# Patient Record
Sex: MALE | Race: WHITE | NOT HISPANIC OR LATINO | Employment: FULL TIME | URBAN - METROPOLITAN AREA
[De-identification: names, ages, dates, MRNs, and addresses within clinical notes are randomized per-mention and may not be internally consistent; named-entity substitution may affect disease eponyms.]

---

## 2018-04-11 ENCOUNTER — TELEPHONE (OUTPATIENT)
Dept: OTHER | Facility: OUTPATIENT CENTER | Age: 28
End: 2018-04-11

## 2018-04-11 NOTE — TELEPHONE ENCOUNTER
Telephone Intake REST  Date: 2018  Client Name:  Justin Morales    MRN: [id number]  : 1990     Age:  28  Caller Name: Self        Presenting Problem / Reason for Assessment   (Clinical History &Symptoms):     Low Libido  Questioning long term medication use (Adderall)   Willing to see therapist       Length of time experiencing symptoms:  Worsening symptoms within the last few month      Therapist:  18 months ago for anxiety        Other Medical/Mental Health Diagnoses:  Sexual Dysfunction (Low Libido)      Medications:  NO        Primary Care Provider: [facility],  [place] [address]                                    [phone]  --If multiple medical conditions, request recent records from primary doctor at the 1st session..      Referral Source:        Follow Up:        Please Verify Registration    If patient has [facility] or [facility], send to .     Prep Welcome Packet and have patient come half hour beforehand to fill out forms in packet (health history form, transgender history, Safety Screening, PHQ9, and EVIE-7.     Paperwork sent   Appt

## 2019-11-19 ENCOUNTER — OFFICE VISIT (OUTPATIENT)
Dept: FAMILY MEDICINE | Facility: CLINIC | Age: 29
End: 2019-11-19
Payer: COMMERCIAL

## 2019-11-19 VITALS
HEART RATE: 82 BPM | HEIGHT: 71 IN | OXYGEN SATURATION: 97 % | BODY MASS INDEX: 31.64 KG/M2 | DIASTOLIC BLOOD PRESSURE: 82 MMHG | WEIGHT: 226 LBS | TEMPERATURE: 98.1 F | SYSTOLIC BLOOD PRESSURE: 135 MMHG

## 2019-11-19 DIAGNOSIS — F41.9 ANXIETY: Primary | ICD-10-CM

## 2019-11-19 DIAGNOSIS — F90.9 ATTENTION DEFICIT HYPERACTIVITY DISORDER (ADHD), UNSPECIFIED ADHD TYPE: ICD-10-CM

## 2019-11-19 DIAGNOSIS — F41.0 PANIC ATTACK: ICD-10-CM

## 2019-11-19 RX ORDER — HYDROXYZINE HYDROCHLORIDE 25 MG/1
25 TABLET, FILM COATED ORAL EVERY 4 HOURS PRN
Qty: 30 TABLET | Refills: 0 | Status: SHIPPED | OUTPATIENT
Start: 2019-11-19 | End: 2023-03-07

## 2019-11-19 RX ORDER — DEXTROAMPHETAMINE SULFATE, DEXTROAMPHETAMINE SACCHARATE, AMPHETAMINE SULFATE AND AMPHETAMINE ASPARTATE 5; 5; 5; 5 MG/1; MG/1; MG/1; MG/1
CAPSULE, EXTENDED RELEASE ORAL
Refills: 0 | COMMUNITY
Start: 2019-10-25 | End: 2020-02-10

## 2019-11-19 RX ORDER — ALUMINUM CHLORIDE 20 %
SOLUTION, NON-ORAL TOPICAL
Refills: 11 | COMMUNITY
Start: 2019-01-07 | End: 2020-01-23

## 2019-11-19 ASSESSMENT — MIFFLIN-ST. JEOR: SCORE: 2020.13

## 2019-11-19 NOTE — PROGRESS NOTES
Preceptor Attestation:   Patient seen, evaluated and discussed with the resident. I have verified the content of the note, which accurately reflects my assessment of the patient and the plan of care.   Supervising Physician:  Bulmaro Packer MD.

## 2019-11-19 NOTE — PROGRESS NOTES
"I was present with the medical student who participated in the service and in the documentation of this note. I have verified the history and personally performed the physical exam and medical decision making, and have verified the content of the note, which accurately reflects my assessment of the patient and the plan of care.   Dana Reyes MD           New Patient Note          HPI         Concerns today: Anxiety and ADHD    Patient presents with:  Establish Care  Referral: ADHD Testing     Anxiety  -had panic attacks in high school,  -started sometime around the age of 20, saw a psychologist (psychiatrist?) after having panic attacks including a reallly bad attack before shoulder surgery   -was diagnosed with anxiety, strong family history of ADHD, was also diagnosed  -over the last few months to a year, panic attacks have been starting to come back  -during an attack, will experience: tachycardia to 100-150's (will generally remove fitbit after reaching 150, as seeing the high number makes him more anxious, sweating, severe anxiety, feels like something horrible is happening)  -usually is able to \"fight through\" the panic attacks and carry on with tasks and ADLs, but is interested in behavioral therapy and learning coping techniques to deal with panic attacks and day-to-day anxiety more effectively    Of note, 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 He feels like his panic attacks and anxiety have worsened as the stressors associated with his program have worsened. (does have positive stresses in life, though- just got  this year and bought a house, happy with these changes)    Additionally, he hsa a flight coming up on 11/24 that he's terrified about, as he had a panic attack on his last flight. He is worried about experiencing another of those panic attacks on the flight.      ADHD  -diagnosed with ADHD around the same time he was " "diagnosed with anxiety by the psychologist he saw when he was 20  -was placed on 20 mg Adderall daily, with 10 mg bumper he could use as needed (although the bumper didn't do much for him, so he usually didn't take it)  Initial benefits: felt like he was able to get his life in order, felt more organized, felt like he was managing things, he felt his actions and his mind were less chaotic, had less trouble with mental hyperactivity, was sleeping better, not as anxious about things  -took Adderall continuously for about 7-8 years (some breaks due to his being in school out of state and away from his prescribing provider)  -as time went on, felt more unease, body aches (especially legs), sexual side effects (\"libido wihplash\"- rapid increases and decreases, affected sexual performance over time)   -talked to about 3 different doctors about sexual side effects of Adderall  -as time went on, felt more unease, body aches (especially legs), sexual side effects (\"libido wihplash\"- rapid increases and decreases, affected sexual performance over time)   -talked to about 3 different doctors about sexual side effects of Adderall  -eventually stopped taking Adderall and said he felt fine; felt he could manage daily life and tasks well  -however, with return of panic attacks, he started noticing difficulties with focusing on tasks, difficulties managing his anxiety, difficulties organizing things and keeping his mind from being hyperactive about things, so he wanted to start Adderall again  -just started Adderall again around 10/25 (prescribed by doctor he saw regarding Lyme disease), about one week of continuous dosing  -feels he is getting all of the bad effects from Adderall: hyper focused in a bad way, like distant focus on things that make him anxious rather than on more immediate tasks, will wake up with bad nightmares, difficult to get back to sleep, unable to focus and complete tasks like normal    Would like to be able " to get through 7-8 months with med function as normal, or get new behavioral techniques to help cope with stress and anxiety.    Planning to obtain primary cares here.    There is no problem list on file for this patient.      Past Medical History:   Diagnosis Date     ADHD      Anxiety        Previous Medical Care   [facility] Kedar CORTEZ signed     History reviewed. No pertinent family history.           Review of Systems:     Review of Systems:  CONSTITUTIONAL: NEGATIVE for fever, chills, change in weight  INTEGUMENTARY/SKIN: NEGATIVE for worrisome rashes, moles or lesions  EYES: NEGATIVE for vision changes or irritation  ENT/MOUTH: NEGATIVE for ear, mouth and throat problems  RESP: NEGATIVE for significant cough or SOB  BREAST: NEGATIVE for masses, tenderness or discharge  CV: NEGATIVE for chest pain, palpitations or peripheral edema  GI: NEGATIVE for nausea, abdominal pain, heartburn, or change in bowel habits  : NEGATIVE for frequency, dysuria, or hematuria  MUSCULOSKELETAL: NEGATIVE for significant arthralgias or myalgia  NEURO: NEGATIVE for weakness, dizziness or paresthesias  ENDOCRINE: NEGATIVE for temperature intolerance, skin/hair changes  HEME/ALLERGY: NEGATIVE for bleeding problems  PSYCHIATRIC: NEGATIVE for changes in mood or affect; anxiety  Sleep:   Do you snore most or the night (as reported by a family member)? No  Do you feel sleepy or extremely tired during most of the day? No             Social History     Social History     Socioeconomic History     Marital status: Single     Spouse name: Not on file     Number of children: Not on file     Years of education: Not on file     Highest education level: Not on file   Occupational History     Not on file   Social Needs     Financial resource strain: Not on file     Food insecurity:     Worry: Not on file     Inability: Not on file     Transportation needs:     Medical: Not on file     Non-medical: Not on file   Tobacco Use     Smoking status: Former  "Smoker     Smokeless tobacco: Never Used   Substance and Sexual Activity     Alcohol use: Yes     Comment: socially      Drug use: Never     Sexual activity: Yes     Partners: Female   Lifestyle     Physical activity:     Days per week: Not on file     Minutes per session: Not on file     Stress: Not on file   Relationships     Social connections:     Talks on phone: Not on file     Gets together: Not on file     Attends Jew service: Not on file     Active member of club or organization: Not on file     Attends meetings of clubs or organizations: Not on file     Relationship status: Not on file     Intimate partner violence:     Fear of current or ex partner: Not on file     Emotionally abused: Not on file     Physically abused: Not on file     Forced sexual activity: Not on file   Other Topics Concern     Not on file   Social History Narrative     Not on file       Marital Status:  Who lives in your household? Wife and dog.    Has anyone hurt you physically, for example by pushing, hitting, slapping or kicking you or forcing you to have sex? Denies  Do you feel threatened or controlled by a partner, ex-partner or anyone in your life? Denies    Sexual Health     Sexual concerns: has had concerns regarding sexual side effects of Adderall in the past; not as concerned about them right now (other bad effects of Adderall of greatest concern)   STI History: Neg             Physical Exam:     Vitals: /82 (BP Location: Left arm, Patient Position: Sitting, Cuff Size: Adult Regular)   Pulse 82   Temp 98.1  F (36.7  C) (Oral)   Ht 1.8 m (5' 10.87\")   Wt 102.5 kg (226 lb)   SpO2 97%   BMI 31.64 kg/m    BMI= Body mass index is 31.64 kg/m .  Vitals were reviewed and were normal  GENERAL: healthy, alert and no distress  EYES: Eyes grossly normal to inspection, extraocular movements - intact, and PERRL  HENT: Normocephalic and atraumatic.  NECK: ROM normal.  RESP: No increased work of breathing. No audible " wheezing.  CV: No JVD. Nail beds pink and well-perfused.  ABDOMEN: Soft, non-distended.  MS: extremities- no gross deformities noted, no edema  SKIN: no suspicious lesions, no rashes  NEURO: mentation- intact, speech- normal  PSYCH: Alert and oriented times 3; speech- coherent , normal rate and volume; able to articulate logical thoughts, able to abstract reason, no tangential thoughts, no hallucinations or delusions, affect- normal      Assessment and Plan      Justin was seen today for establish care and referral.    Diagnoses and all orders for this visit:    Anxiety  -     BEHAVIORAL HEALTH REFERRAL ([facility] interal and external)  -     hydrOXYzine (ATARAX) 25 MG tablet; Take 1 tablet (25 mg) by mouth every 4 hours as needed for itching    Symptoms of anxiety and ADHD difficult to separate completely, although considering situational stressors, it appears like anxiety may be mimicking ADHD symptoms. Would explain why adderall is not helping and is instead making him focus on things that make him anxious. He has stopped adderall, so recommended waiting to restart adderall (or at least decreasing dose of adderall) until anxiety can be addressed. He was interested in behavioral therapy so a referral was made. Also provided him with a prescription for hydroxyzine to aid in management of anxiety. If hydroxyzine does not provide sufficient relief of symptoms, recommended he call the clinic, and a prescription for ativan may be provided, especially for his upcoming flight.    He is planning to establish primary care here, so recommended regular follow up as needed to reassess medication efficacy and anxiety and symptom management.      Options for treatment and follow-up care were reviewed with the patient. Justin Morales engaged in the decision making process and verbalized understanding of the options discussed and agreed with the final plan.    Soha Tom, MS3

## 2019-11-19 NOTE — PATIENT INSTRUCTIONS
Here is the plan from today's visit    Anxiety  - BEHAVIORAL HEALTH REFERRAL ([facility] interal and external)  1) Our behavioural health team will call you to make an appointment - if you do not hear from us in 1 week please call us.  2) Use hydroxyzine as needed for anxiety. If you are concerned about your flight and hydroxyzine does not work well, please call and we can prescribe other medications for your flight.  - hydrOXYzine (ATARAX) 25 MG tablet; Take 1 tablet (25 mg) by mouth every 4 hours as needed for itching  Dispense: 30 tablet; Refill: 0    Please call or return to clinic if your symptoms don't go away.  Thank you for coming to [facility] today.  Lab Testing:  **If you had lab testing today and your results are reassuring or normal they will be mailed to you or sent through [employer] within 7 days.   **If the lab tests need quick action we will call you with the results.  The phone number we will call with results is # [phone] (home) . If this is not the best number please call our clinic and change the number.  Medication Refills:  If you need any refills please call your pharmacy and they will contact us.   If you need to  your refill at a new pharmacy, please contact the new pharmacy directly. The new pharmacy will help you get your medications transferred faster.   Scheduling:  If you have any concerns about today's visit or wish to schedule another appointment please call our office during normal business hours [phone] (8-5:00 M-F)  If a referral was made to a [facility] and you don't get a call from central scheduling please call [phone].  If a Mammogram was ordered for you at [facility] call [phone] to schedule or change your appointment.  If you had an XRay/CT/Ultrasound/MRI ordered the number is [phone] to schedule or change your radiology appointment.   Medical Concerns:  If you have urgent medical concerns please call  [phone] at any time of the day.    Dana Reyes MD

## 2019-11-20 ENCOUNTER — TELEPHONE (OUTPATIENT)
Dept: PSYCHOLOGY | Facility: CLINIC | Age: 29
End: 2019-11-20

## 2019-11-20 NOTE — LETTER
December 4, 2019    Justin Morales  [address]  [place] [address]      Dear: Justin Morales    You were recently referred for a Behavioral Health appointment by your primary care provider at [facility].  We have called twice to schedule this appointment, but have been unable to reach you.  If you are interested in receiving this service, please call [facility] at [phone].  We would be happy to schedule this appointment for you.      Thank you.      Sincerely,    Patient Representative    [facility]  Hours:  Monday-Friday 8:00 am - 5:00 pm   Phone Number: [phone]

## 2019-11-20 NOTE — TELEPHONE ENCOUNTER
Psychiatry Consult and  Referral:  Psychiatry Consult Referral:  Please schedule this patient with Dr. Paiz.  This is for a one time consult only, not for ongoing psychiatric care.  She will provide recommendations to the PCP for ongoing care.     Please let the patient know that this is an educational consult clinic.  For that reason, Dr. Paiz and a resident will be seeing the patient together.  If the patient is not comfortable with that we can assist with making arrangements for a psychiatry consult at an outside clinic.    If you are unable to reach the patient after two phone calls, please send a letter and close this encounter.    Thank you!    -------------------------------------------------------------------    AND    -------------------------------------------------------------------    Mental Health Referral:  Please schedule with Dr. Moon or Jamie      Please let patient know this is for primary care behavioral health services.  Therapists at our clinic are able to see patients for 8-12 sessions. If further assistance is needed, they will help the patient connect with ongoing services in the community.    If you are unable to reach the patient after two phone attempts, please send a letter and close the encounter.      Thank you!

## 2019-11-20 NOTE — TELEPHONE ENCOUNTER
Left voicemail to call direct line for assistance with scheduling.    Shanti Chao CMA  [facility]

## 2019-11-22 NOTE — TELEPHONE ENCOUNTER
Mental Health appointment scheduled.     Psychiatrist has not been scheduled due to Dr Paiz schedule is full until 1/9/20.    I will follow up when schedule is open.      Shanti Chao CMA  [facility]

## 2019-12-02 NOTE — TELEPHONE ENCOUNTER
The schedule is is still not available for scheduling.    Shanti Chao CMA  [facility]

## 2019-12-04 NOTE — TELEPHONE ENCOUNTER
Left voicemail to call direct line for assistance with scheduling Dr Paiz (only).    Letter mailed     Shanti Chao CMA  [facility]

## 2019-12-11 ENCOUNTER — OFFICE VISIT (OUTPATIENT)
Dept: PSYCHOLOGY | Facility: CLINIC | Age: 29
End: 2019-12-11
Payer: COMMERCIAL

## 2019-12-11 DIAGNOSIS — F41.0 GENERALIZED ANXIETY DISORDER WITH PANIC ATTACKS: Primary | ICD-10-CM

## 2019-12-11 DIAGNOSIS — F41.1 GENERALIZED ANXIETY DISORDER WITH PANIC ATTACKS: Primary | ICD-10-CM

## 2019-12-11 ASSESSMENT — ANXIETY QUESTIONNAIRES
2. NOT BEING ABLE TO STOP OR CONTROL WORRYING: SEVERAL DAYS
5. BEING SO RESTLESS THAT IT IS HARD TO SIT STILL: SEVERAL DAYS
3. WORRYING TOO MUCH ABOUT DIFFERENT THINGS: SEVERAL DAYS
7. FEELING AFRAID AS IF SOMETHING AWFUL MIGHT HAPPEN: SEVERAL DAYS
6. BECOMING EASILY ANNOYED OR IRRITABLE: SEVERAL DAYS
IF YOU CHECKED OFF ANY PROBLEMS ON THIS QUESTIONNAIRE, HOW DIFFICULT HAVE THESE PROBLEMS MADE IT FOR YOU TO DO YOUR WORK, TAKE CARE OF THINGS AT HOME, OR GET ALONG WITH OTHER PEOPLE: SOMEWHAT DIFFICULT
4. TROUBLE RELAXING: MORE THAN HALF THE DAYS
GAD7 TOTAL SCORE: 9
1. FEELING NERVOUS, ANXIOUS, OR ON EDGE: MORE THAN HALF THE DAYS

## 2019-12-11 ASSESSMENT — PATIENT HEALTH QUESTIONNAIRE - PHQ9: SUM OF ALL RESPONSES TO PHQ QUESTIONS 1-9: 2

## 2019-12-11 NOTE — PROGRESS NOTES
Behavioral Health Diagnostic Assessment:    Meeting was: scheduled  Others present: {OTHERS PRESENT:302167}  Meeting lasted: *** minutes  Client was: {ON TIME:603246}    {If  used, use [place])    Assessment Summary: Diagnostic completed today. The patient is a 29 year old  male who was referred for mental health services for help with ***. Based on the patient's report of symptoms, {HE OR SHE:076012} likely meets criteria for ***. However, additional assessment is warranted and a diagnosis of *** is being given today. The patient's mental health concerns have been affecting{HE OR SHE:604377} ability to function *** and have been causing clinically significant distress. The patient also reports *** drug/alcohol use amount frequency and duration. The patient is also struggling with *** insert other problematic psychosocial stressors. Justin reports/denies [safety concerns]. ***Insert any additional pertinent information here including behavioral observations/interaction style, etc. Based on the patient's reported symptoms and impact on functioning, the plan for the patient is ***.    DSM-V Diagnoses:  ***    Orientation, Recommendations, & Plan:       Rights to and limits to confidentiality were discussed with patient.    Integrated care team and shared chart were discussed with patient and agreed upon.    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    Patient was given informational handout on postdoctoral fellow status and was invited to ask questions. [Given the language barrier, the handout was reviewed with the patient and he was asked whether he had a resource to help him translate the contents of the handout. The patient {insert response to language information}]     Follow-up in *** to establish regular psychotherapy to address ***.    Goals for therapy = ***    {Collaboration:251588}.    {Referral to  "Professional:475997}.    {DANA:277106}.    Mental Health Screening Questionnaires:    No flowsheet data found.  No flowsheet data found.  PTSD-PC = ***/4  CAGE AID:  ***/4       Current Presenting Problems or Complaints (including patient perception of problem and external factors contributing to current dilemma): ***    Review of Symptoms:  Depression: ***  Gabby: ***  Psychosis: ***  Anxiety: ***  Post Traumatic Stress Disorder: ***    Functioning:  ***    Patient Strengths and Resources: ***    Previous Mental Health Concerns/Treatment:    Outpatient: {NONE:171902::\"None\"}    Inpatient: {NONE:441876::\"None\"}    Chemical Health History:    Alcohol Use: ***  Drug Use: ***  Prescription Misuse: ***  Tobacco Use: ***    Have you ever thought about cutting down your drug/alcohol use?  {YES/NO:890494}  Do you ever get annoyed when people ask you about your drug/alcohol use: {YES/NO:096266}  Do you ever feel guilty about your drug/alcohol use: {YES/NO:790997}  Do you ever drink alcohol or use drugs in the morning: {YES/NO:664359}    Patient past attempts to control alcohol/drug use (including informal approaches or formal treatment): ***  Have there been any consequences related to clients drug use? {DRUG USE CONSEQUENCES:414508}.    Mental Status Exam:    Appearance:  {APPEARANCE:441948}  Behavior/Relationship to Examiner/Demeanor:  {:782272}  Build: {BUILD:906177}  Gait:  {NORMAL, ABNORMAL:159887::\"Normal\"}  Psychomotor Activity: {AGITATION / RETARDATION:234862}  Speech rate:  {:636643}  Speech volume:  {:961605}  Speech coherence:  {:427828}  Speech spontaneity:  {:065864}  Mood (subjective report):  {MOOD (SUBJECTIVE REPORT):567643}  Affect (objective appearance):  {:668619}  Eye Contact: {eye contact:473929}  Thought Process (Associations):  {THOUGHT PROCESS (ASSOCIATIONS):126703}  Thought process (Rate):  {:037360}  Abnormal Perception:  {:117843}  Sensorium:  {:189695}  Attention/Concentration:  " {:616135}  Insight:  {INSIGHT:042240}  Judgment:  {JUDGMENT:214033}    Suicide Assessment:    Recent suicidal thoughts: {YES (EXPLAIN)/NO:130690}  Past suicidal thoughts: {YES (EXPLAIN)/NO:829206}  Any attempts in the past: {YES (EXPLAIN)/NO:228772}  Any family/friends/loved ones die by suicide: {YES (EXPLAIN)/NO:578180}  Plan or considering various methods: {YES (EXPLAIN)/NO:269706}  Access to guns: {YES (EXPLAIN)/NO:669707}  Protective factors: {Suicide Protective Factors:730740}  Verbal contract for safety: {YES/NO:998532}    Non-Suicidal Self Injurious Behavior: {YES (EXPLAIN)/NO:115573}    Violence/Homicide Risk Assessment:     Problems with anger management: {YES (EXPLAIN)/NO:045397}  History of violence: {YES (EXPLAIN)/NO:081898}  History of significant damage to property: {YES (EXPLAIN)/NO:066971}  Threat made to harm or kill someone: {YES (EXPLAIN)/NO:485924}  Verbal contract for safety: {YES/NO:080872}    Safety Plan:   Justin was provided with the phone number for emergency mental health services and was encouraged to call these local crisis numbers, 911, or visit a local emergency room if thoughts of suicide or homicide were to arise and/or if they were to be in acute distress. The patient agreed to utilize these services as indicated if the need were to arise. Justin denied past or current suicidal or homicidal ideation, intent, or plan, denied a history of suicide attempts/self-harming behaviors, and identified *** as  primary protective factor(s) to self-harm or harm to others. Based on these factors, Justin is considered to be sustainable as an outpatient at this time.     Social History and Associated Level of Functioning (See below):    Current Living Arrangements: Lives in a home with his partner in their recently bought house.     Family/Children: ***    Intimate Relationship/Marriage: Recently     Social Connection: ***    Developmental History: ***    Abuse/Trauma: ***    Work:  "***    Education: He is a doctoral  in physics at the [employer]    Legal: ***    Cultural/Belief System: ***    Personal Health:     Patient Active Problem List   Diagnosis     ADHD (attention deficit hyperactivity disorder)     Anxiety     Current Outpatient Medications   Medication     ADDERALL XR 20 MG 24 hr capsule     DRYSOL 20 % external solution     hydrOXYzine (ATARAX) 25 MG tablet     No current facility-administered medications for this visit.        Family Health History: ***    NOTE: Diagnostic complete   NOTE: Diagnostic incomplete. Will be completed at next visit.    Primary Care PTSD Screen  In your life, have you ever had any experience that was so frightening, horrible or upsetting that, in the past month, you...    1. Have had nightmares about it or thought about it when you did not want to? {YES/NO:870955}  2. Tried hard not to think about it or went out of your way to avoid situations that remind you of it? {YES/NO:393370}  3. Were constantly on guard, watchful, or easily startled? {YES/NO:601462}  4. Felt numb or detached from others, activities, or your surroundings? {YES/NO:634330}    Current research suggests that the results of the PC-PTSD should be considered \"positive\" if a patient answers \"yes\" to any (3) items.    References    MICHELA Fu, REANNA Hwaley, Kimerling, R., ROSA MARIA Ashby., LOUISE Gonzalez., TETO Zaldivar., MICHELA Woo, GERBER Phoenix, Pantoja, J.I. (2004). The primary care PTSD screen (PC-PTSD): development and operating characteristics. Primary Care Psychiatry, 9, 9-14.    "

## 2019-12-11 NOTE — PROGRESS NOTES
Behavioral Health Diagnostic Assessment:    Meeting was: scheduled  Others present: none  Meeting lasted: 40 minutes  Client was: on time    Assessment Summary: Diagnostic completed today. The patient is a 29 year old  male who was referred for mental health services for help with panic attacks and symptoms of anxiety. Based on the patient's report of symptoms, he likely meets criteria for generalized anxiety disorder with panic attacks. The patient's mental health concerns have been affecting his ability to function at work and in his personal life, and have been causing clinically significant distress. The patient reports no concerns with drug/alcohol use. The patient is also struggling with psychosocial stressors including completing his doctoral degree and job searching. Justin denies suicidal ideation, plans, means, or intent. Based on the patient's reported symptoms and impact on functioning, the plan for the patient is to initiate psychotherapy services at [facility] to address anxiety and panic symptoms.    DSM-V Diagnoses:  Generalized anxiety disorder with panic attacks    Orientation, Recommendations, & Plan:       Rights to and limits to confidentiality were discussed with patient.    Integrated care team and shared chart were discussed with patient and agreed upon.    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    Patient was given informational handout on postdoctoral fellow status and was invited to ask questions.     Follow-up in 2 to establish regular psychotherapy to address problematic symptoms of anxiety and panic symptoms.    Goals for therapy = Reduce anxiety and learn coping strategies to manage anxiety and panic attacks    Mental Health Screening Questionnaires:    PHQ-9 SCORE 12/11/2019   PHQ-9 Total Score 2     EVIE-7 SCORE 12/11/2019   Total Score 9     PTSD-PC = 0/4  CAGE AID:  1/4       Current Presenting Problems or Complaints (including patient  "perception of problem and external factors contributing to current dilemma): Justin reported a history of panic attacks and anxiety. He reported having panic attacks since a child and teenager. Most of his anxiety then centered on unmanaged ADHD symptoms which ameliorated once he started ADHD medication. He reported having attacks infrequently after this. Reported feeling anxious and stressed in college, but felt he could manage this. For the past year, reported increasing anxiety due to various stressors and life changes. He noted that panic attacks have worsened in the past 6 months. Described symptoms consistent with panic attacks (sweating, increased heart rate and breathing, racing thoughts, dizziness, fear of losing control). Panic attacks occur about once a week or every two weeks.     He reported various reasons that contribute to increased anxiety. He is on his last year of his doctoral program and is hoping to defend in May. Discussed the difficulties of getting his projects/research to work. Noted that his advisor will be leaving and that they've had difficulties with funding. He's also job searching and interviewing at the moment. 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 Said these are highly paid jobs, however, they are very time-demanding (\"you're always on call because you're the only one who can fix those machines\"). He no longer wishes to work in this environment. Is considering teaching jobs in high school or switching into some form of business. This has created additional stress because he is overqualified or lacks some of the experience employers are looking for. Justin also recently got  and they bought a house together. Denied financial stress since his wife is in a stable and well-paying job, however, he's feeling pressure in having more commitments to think of as he's job-searching.     Review of Symptoms:  Depression: Denied symptoms " of depression. Endorsed some difficulties with sleep or feeling tired which he attributed to long work schedule and stress.   Gabby: Denied  Psychosis: Denied  Anxiety: Reported feeling anxious more days than not, has several worries, difficulty controlling worry, unable to relax, feels restless, irritable, feels afraid something awful will happen, and difficulty sleeping  Post Traumatic Stress Disorder: Denied    Functioning:  Noted that he usually feels able to manage anxiety overall, however, this is much higher than usual and his coping strategies do not seem to be reducing anxiety as it used to in the past. As a result, he is having increased panic attacks which are debilitating at work and at home.     Patient Strengths and Resources: Very intelligent, resourceful, and methodical problem-solving.    Previous Mental Health Concerns/Treatment:    Outpatient: Saw a therapist in high school for panic attacks related to a shoulder injury and with ADHD symptoms. Saw him intermittently for about a year. Saw a therapist for one session in college.     Inpatient: None    Chemical Health History:    Alcohol Use: Reported social use, no concerns identified  Drug Use: Denied  Prescription Misuse: Denied  Tobacco Use: Denied    Have you ever thought about cutting down your drug/alcohol use?  Yes, in the context of abnormal liver enzymes, however, another reason was identified  Do you ever get annoyed when people ask you about your drug/alcohol use: No  Do you ever feel guilty about your drug/alcohol use: No  Do you ever drink alcohol or use drugs in the morning: No    Patient past attempts to control alcohol/drug use (including informal approaches or formal treatment): N/A  Have there been any consequences related to clients drug use? no.    Mental Status Exam:    Appearance:  Casually dressed and Well groomed  Behavior/Relationship to Examiner/Demeanor:  Cooperative, Engaged and Pleasant  Build: medium  Gait:   "Normal  Psychomotor Activity: agitation  Speech rate:  Rapid  Speech volume:  Normal  Speech coherence:  Normal  Speech spontaneity:  Normal  Mood (subjective report):  \"good\"  Affect (objective appearance):  Appropriate/mood-congruent and Euthymic  Eye Contact: good  Thought Process (Associations):  Logical, Linear and Goal directed  Thought process (Rate):  Normal  Abnormal Perception:  None  Sensorium:  Alert  Attention/Concentration:  Normal  Insight:  Fair  Judgment:  Fair    Suicide Assessment:    Recent suicidal thoughts: No  Past suicidal thoughts: No  Any attempts in the past: No  Any family/friends/loved ones die by suicide: No  Plan or considering various methods: No  Access to guns: No  Protective factors: no h/o suicide attempt, no plan or intent, no h/o risky impulsive behavior, h/o seeking help when needed, symptom improvement, future oriented, feeling hopeful, commitment to family, good social support   and stable housing  Verbal contract for safety: N/A    Non-Suicidal Self Injurious Behavior: No    Violence/Homicide Risk Assessment:     Problems with anger management: No  History of violence: No  History of significant damage to property: No  Threat made to harm or kill someone: No  Verbal contract for safety: N/A    Safety Plan:   Justin was provided with the phone number for emergency mental health services and was encouraged to call these local crisis numbers, 911, or visit a local emergency room if thoughts of suicide or homicide were to arise and/or if they were to be in acute distress. The patient agreed to utilize these services as indicated if the need were to arise. Justin denied past or current suicidal or homicidal ideation, intent, or plan, denied a history of suicide attempts/self-harming behaviors, and identified his family and friends as  primary protective factor(s) to self-harm or harm to others. Based on these factors, Justin is considered to be sustainable as an outpatient " "at this time.     Social History and Associated Level of Functioning (See below):    Current Living Arrangements: Lives in a home with his partner in their recently bought house.     Family/Children: No children. Born in [place] with parents and two siblings (25 and 23 years old).     Intimate Relationship/Marriage: Recently  in June. Had been together for 3 years beforehand. Described as a very positive and happy relationship.     Social Connection: Lots of close friends.     Developmental History: WNL. Diagnosed with ADHD in high school.    Abuse/Trauma: Denied.     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    Legal: None    Cultural/Belief System: No Anabaptism beliefs.     Personal Health:     Patient Active Problem List   Diagnosis     ADHD (attention deficit hyperactivity disorder)     Anxiety     Current Outpatient Medications   Medication     ADDERALL XR 20 MG 24 hr capsule     DRYSOL 20 % external solution     hydrOXYzine (ATARAX) 25 MG tablet     No current facility-administered medications for this visit.        Family Health History: History of ADHD and panic attacks in family    NOTE: Diagnostic complete     Primary Care PTSD Screen  In your life, have you ever had any experience that was so frightening, horrible or upsetting that, in the past month, you...    1. Have had nightmares about it or thought about it when you did not want to? No  2. Tried hard not to think about it or went out of your way to avoid situations that remind you of it? No  3. Were constantly on guard, watchful, or easily startled? No  4. Felt numb or detached from others, activities, or your surroundings? No    Current research suggests that the results of the PC-PTSD should be considered \"positive\" if a patient answers \"yes\" to any (3) items.    References    MICHELA Fu, Marleen P., " Kimerling, R., ROSA MARIA Ashby., LOUISE Gonzalez., TETO Zaldivar., MICHELA Woo, JOSE Phoenix., , JFrankoI. (2004). The primary care PTSD screen (PC-PTSD): development and operating characteristics. Primary Care Psychiatry, 9, 9-14.

## 2019-12-11 NOTE — PATIENT INSTRUCTIONS
Thank you for coming in today.    Just a reminder that if you experience an increase in symptoms or feel you are in crisis, you can call the clinic number ([phone]).      If you are having an acute psychiatric emergency, please call 911 or have someone drive you directly to [facility] for further evaluation.  The homework/goals we discussed today are: Read through handout  Please schedule at the  for January 3, 2020 at 8:20 am.  Again thank you for choosing [facility] and please let us know how we can best partner with you to improve your and your family's health.    Panic Disorder Handout  What Is a Panic Attack?   Sometimes we experience a sudden and severe onset of symptoms that can be scary. These symptoms can include some or all of the following:    Pounding heart or increased heart rate   Feeling dizzy, unsteady, lightheaded, or faint     Sweating    Nausea   Feelings of unreality or being detached from yourself     Trembling or shaking   Fear of losing control or going crazy     Shortness of breath   Fear of dying     Feeling of choking   Numbness or tingling     Chest pain   Chills or hot flashes     Although we don t fully understand why some people experience panic attacks and other people don t, we do know that these symptoms are related to a very normal response called the fight-flight response. This response allows our body to react quickly when we think that something is dangerous, such as being attacked or being cut off when we are driving.     How Panic Attacks Affect Our Lives   Because symptoms of a panic attack occur out of the blue, we can become worried about them, and we may begin to avoid situations that we think will result in these panic symptoms, such as crowded stores, public transportation, or driving. What situations have you avoided because of panic attacks?            Changing Thinking Patterns  One of the most important changes  associated with decreasing panic attacks is changing how we think. The fear associated with having a panic attack may increase the likelihood of having an attack. Therefore, a willingness to experience a panic attack, knowing that the symptoms, although uncomfortable, will not harm you, is an important aspect of managing the symptoms of panic.    Thinking that increases panic Thinking that decreases panic   I m having a heart attack! This is not an emergency.   I m going to die. This doesn t feel good, but it won t hurt me.   I can t stand this. I can feel uncomfortable and still be OK.   I have to get out of here. This will go away with time.   Oh no, here it comes! I can handle this.     What are the things that you say to yourself that may increase panic symptoms?               What could you say to decrease panic symptoms?         Breathing Retraining  People who have panic attacks show some signs of hyperventilation or overbreathing. When people hyperventilate, certain blood vessels in the body become narrower, which can contribute to numbness or tingling in the hands or feet or the sensation of cold, clammy hands, and increased heart rate. You can help overcome overbreathing by learning breathing control.  Instructions for Breathing Retraining   1. Choose a comfortable quiet location.  2. Count,  One,  on the first breath in, and think,  Relax,  on the breath out.  3. Focus your attention on breathing and counting.  4. Maintain a normal rate and depth of breathing.  5. Expand your abdomen on the breath in and keep your chest still.  6. Continue counting up to 10 and back to 1.  7. Practice two times per day, for 10 minutes each time.  Decreasing Avoidance  Regardless of whether you can identify why you began having panic attacks or whether they seemed to come out of the blue, the places where you began having panic attacks often can become triggers themselves.   To break the cycle of avoidance, it is important  to first identify the places or situations that are being avoided and then to do some  relearning.  Just as the negative experience of a panic attack can result in learning to avoid certain locations, having positive, successful experiences can result in learning that the location is nothing to be afraid of.    Which item on your list of avoided locations or situations would you like to target first?     Please list this situation here: _______________________   Now, you can develop a hierarchy for this situation or location. A hierarchy is a list of situations that result in increasingly higher intensities of anxiety. Develop a list of situations that result in a range of anxiety intensities, that is, some result in low intensity and others result in higher intensities. Then rank order the situation from the lowest to the highest anxiety producing situations. This hierarchy will help guide you as you gradually begin to  expose  yourself to the situation or location that you have been avoiding    JAMMIE Saenz., FREDERIC Nielson., Emil, M. S., & MICHELA Chau. (2009). Integrated Behavioral [place] in Primary Care: Step-by-Step Guidance for Assessment and Intervention. American Psychological Association.

## 2019-12-11 NOTE — PROGRESS NOTES
Behavioral Health Diagnostic Assessment:    Meeting was: scheduled  Others present: {OTHERS PRESENT:320452}  Meeting lasted: *** minutes  Client was: {ON TIME:984276}    {If  used, use Xencor)    Assessment Summary: Diagnostic completed today. The patient is a 29 year old  male who was referred for mental health services for help with ***. Based on the patient's report of symptoms, {HE OR SHE:701836} likely meets criteria for ***. However, additional assessment is warranted and a diagnosis of *** is being given today. The patient's mental health concerns have been affecting{HE OR SHE:852991} ability to function *** and have been causing clinically significant distress. The patient also reports *** drug/alcohol use amount frequency and duration. The patient is also struggling with *** insert other problematic psychosocial stressors. Justin reports/denies [safety concerns]. ***Insert any additional pertinent information here including behavioral observations/interaction style, etc. Based on the patient's reported symptoms and impact on functioning, the plan for the patient is ***.    DSM-V Diagnoses:  ***    Orientation, Recommendations, & Plan:       Rights to and limits to confidentiality were discussed with patient.    Integrated care team and shared chart were discussed with patient and agreed upon.    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    Patient was given informational handout on postdoctoral fellow status and was invited to ask questions. [Given the language barrier, the handout was reviewed with the patient and he was asked whether he had a resource to help him translate the contents of the handout. The patient {insert response to language information}]     Follow-up in *** to establish regular psychotherapy to address ***.    Goals for therapy = ***    {Collaboration:168812}.    {Referral to  "Professional:834916}.    {DANA:550056}.    Mental Health Screening Questionnaires:    No flowsheet data found.  No flowsheet data found.  PTSD-PC = ***/4  CAGE AID:  ***/4       Current Presenting Problems or Complaints (including patient perception of problem and external factors contributing to current dilemma): ***    Started having panic attacks in high school. Met with a therapist for a year.     History of ADHD in family.     Review of Symptoms:  Depression: ***  Gabby: ***  Psychosis: ***  Anxiety: ***  Post Traumatic Stress Disorder: ***    Functioning:  ***    Patient Strengths and Resources: ***    Previous Mental Health Concerns/Treatment:    Outpatient: {NONE:049593::\"None\"}    Inpatient: {NONE:184700::\"None\"}    Chemical Health History:    Alcohol Use: ***  Drug Use: ***  Prescription Misuse: ***  Tobacco Use: ***    Have you ever thought about cutting down your drug/alcohol use?  {YES/NO:437250}  Do you ever get annoyed when people ask you about your drug/alcohol use: {YES/NO:188916}  Do you ever feel guilty about your drug/alcohol use: {YES/NO:683890}  Do you ever drink alcohol or use drugs in the morning: {YES/NO:581102}    Patient past attempts to control alcohol/drug use (including informal approaches or formal treatment): ***  Have there been any consequences related to clients drug use? {DRUG USE CONSEQUENCES:480227}.    Mental Status Exam:    Appearance:  {APPEARANCE:786286}  Behavior/Relationship to Examiner/Demeanor:  {:147053}  Build: {BUILD:562888}  Gait:  {NORMAL, ABNORMAL:589596::\"Normal\"}  Psychomotor Activity: {AGITATION / RETARDATION:050202}  Speech rate:  {:773069}  Speech volume:  {:652247}  Speech coherence:  {:278401}  Speech spontaneity:  {:353767}  Mood (subjective report):  {MOOD (SUBJECTIVE REPORT):556306}  Affect (objective appearance):  {:372194}  Eye Contact: {eye contact:745536}  Thought Process (Associations):  {THOUGHT PROCESS (ASSOCIATIONS):419204}  Thought process (Rate): "  {:374752}  Abnormal Perception:  {:353771}  Sensorium:  {:146851}  Attention/Concentration:  {:181735}  Insight:  {INSIGHT:896753}  Judgment:  {JUDGMENT:650981}    Suicide Assessment:    Recent suicidal thoughts: {YES (EXPLAIN)/NO:365085}  Past suicidal thoughts: {YES (EXPLAIN)/NO:759983}  Any attempts in the past: {YES (EXPLAIN)/NO:492905}  Any family/friends/loved ones die by suicide: {YES (EXPLAIN)/NO:313570}  Plan or considering various methods: {YES (EXPLAIN)/NO:254432}  Access to guns: {YES (EXPLAIN)/NO:425884}  Protective factors: {Suicide Protective Factors:509048}  Verbal contract for safety: {YES/NO:585012}    Non-Suicidal Self Injurious Behavior: {YES (EXPLAIN)/NO:930395}    Violence/Homicide Risk Assessment:     Problems with anger management: {YES (EXPLAIN)/NO:167218}  History of violence: {YES (EXPLAIN)/NO:511399}  History of significant damage to property: {YES (EXPLAIN)/NO:351844}  Threat made to harm or kill someone: {YES (EXPLAIN)/NO:268130}  Verbal contract for safety: {YES/NO:318388}    Safety Plan:   Justin was provided with the phone number for emergency mental health services and was encouraged to call these local crisis numbers, 911, or visit a local emergency room if thoughts of suicide or homicide were to arise and/or if they were to be in acute distress. The patient agreed to utilize these services as indicated if the need were to arise. Justin denied past or current suicidal or homicidal ideation, intent, or plan, denied a history of suicide attempts/self-harming behaviors, and identified *** as  primary protective factor(s) to self-harm or harm to others. Based on these factors, Justin is considered to be sustainable as an outpatient at this time.     Social History and Associated Level of Functioning (See below):    Current Living Arrangements: Lives in a home with his partner in their recently bought house.     Family/Children: No children. Born in [place] with parents and two  "siblings ( 2 years and 4 years).     Intimate Relationship/Marriage: Recently  in June. Had been together for 3 years.     Social Connection: Lots of close friends.     Developmental History: WNL. Diagnosed with ADHD in high school.    Abuse/Trauma: Denied.     Work: Works at a lab as a .     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    Legal: None    Cultural/Belief System: None    Personal Health:     Patient Active Problem List   Diagnosis     ADHD (attention deficit hyperactivity disorder)     Anxiety     Current Outpatient Medications   Medication     ADDERALL XR 20 MG 24 hr capsule     DRYSOL 20 % external solution     hydrOXYzine (ATARAX) 25 MG tablet     No current facility-administered medications for this visit.        Family Health History: History of ADHD in family    NOTE: Diagnostic complete   NOTE: Diagnostic incomplete. Will be completed at next visit.    Primary Care PTSD Screen  In your life, have you ever had any experience that was so frightening, horrible or upsetting that, in the past month, you...    1. Have had nightmares about it or thought about it when you did not want to? {YES/NO:835717}  2. Tried hard not to think about it or went out of your way to avoid situations that remind you of it? {YES/NO:410841}  3. Were constantly on guard, watchful, or easily startled? {YES/NO:299306}  4. Felt numb or detached from others, activities, or your surroundings? {YES/NO:739904}    Current research suggests that the results of the PC-PTSD should be considered \"positive\" if a patient answers \"yes\" to any (3) items.    References    MICHELA Fu, REANNA Hawley, Kimerling, R., ROSA MARIA Ashby., LOUISE Gonzalez., FREDERIC Zaldivar, MICHELA Woo, JOSE Phoenix., Pantoja, J.I. (2004). The primary care PTSD screen (PC-PTSD): development and operating characteristics. Primary Care Psychiatry, 9, 9-14.    "

## 2019-12-12 ASSESSMENT — ANXIETY QUESTIONNAIRES: GAD7 TOTAL SCORE: 9

## 2019-12-16 NOTE — PROGRESS NOTES
Preceptor Attestation:  I have reviewed and agree with the behavioral health fellow's documentation for this visit.  I did not see the patient.  Supervising Clinical Psychologist:  Edith Cortés PSYD LP

## 2020-01-03 ENCOUNTER — OFFICE VISIT (OUTPATIENT)
Dept: PSYCHOLOGY | Facility: CLINIC | Age: 30
End: 2020-01-03
Payer: COMMERCIAL

## 2020-01-03 DIAGNOSIS — F41.0 GENERALIZED ANXIETY DISORDER WITH PANIC ATTACKS: Primary | ICD-10-CM

## 2020-01-03 DIAGNOSIS — F41.1 GENERALIZED ANXIETY DISORDER WITH PANIC ATTACKS: Primary | ICD-10-CM

## 2020-01-03 NOTE — PATIENT INSTRUCTIONS
Thank you for coming in today.    Just a reminder that if you experience an increase in symptoms or feel you are in crisis, you can call the clinic number ([phone]).      If you are having an acute psychiatric emergency, please call 911 or have someone drive you directly to [facility] for further evaluation.  The homework/goals we discussed today are: Practice deep breathing and keep up with coping strategies  Please follow-up in 2 weeks.  Again thank you for choosing [facility] and please let us know how we can best partner with you to improve your and your family's health.

## 2020-01-03 NOTE — PROGRESS NOTES
Behavioral Health Progress Note    Client Legal Name: Justin Morales   Client Preferred Name: Justin   Service Type: Individual  Length of Visit: 40 minutes  Attendees: patient     Identifying Information and Presenting Problem:    The patient is a 29 year old  male who is being seen for problematic symptoms of anxiety and panic attacks.    Treatment Objective(s) Addressed in This Session:  Anxiety: will experience a reduction in anxiety, will develop more effective coping skills to manage anxiety symptoms, will develop healthy cognitive patterns and beliefs and will increase ability to function adaptively    Progress on / Status of Treatment Objective(s) / Homework:  Satisfactory progress       PHQ-9 SCORE 12/11/2019   PHQ-9 Total Score 2       EVIE-7 SCORE 12/11/2019   Total Score 9       Topics Discussed/Interventions Provided:  Justin endorsed good mood due to the holidays and amelioration of anxiety around the holiday break. Said that now that the break is over he does have more writing and work to catch up with before the semester starts. Noted that in the past few weeks he has not experienced daily anxiety as much, however, he has been waking up with panic symptoms a few times a week. These started when he was sick with a cold and taking dayquil/nyquil. He since stopped this medication and drinking caffeine which has helped. Does not remember any consistent dreams or worries before sleep. Said he will wake up around 3-4 am with his heart pounding and the thought that he will die of a heart attack. Utilizes breathing and reassurance to calm himself down. Is unable to fall asleep again sometimes. Justin was previously prescribed hydroxizine and instructed to take before flying. He is unsure he could take this to help with sleep. Discussed option of speaking to PCP about this medication or an alternative to help with anxiety and sleep. Justin also has an appointment scheduled with Dr. Paiz on  1/23/20. He said he would prefer to wait until this appointment.     Today we discussed coping strategies and source of increased anxiety. Justin reported daily exercise, spending time with his dog and wife, baking, and implementing sleep hygiene strategies (e.g., no caffeine, reducing screen time, relaxation) to help with anxiety and sleep. Said he learned about deep breathing before and practices this strategy frequently to focus and calm down. Said these strategies have been helpful. Reinforced these positive coping strategies. Discussed two main stressors - dissertation research and job searching. Explored thinking patterns around these stressors and reinforced self-reassurance and coping statements to manage stress.     Assessment: The patient appeared to be active and engaged in today's session and was receptive to feedback.     Mental Status: Justin appeared generally alert and oriented. Dress was casual and appropriate to the weather and occasion. Grooming and hygiene were good. Eye contact was good. Speech was of normal volume and rate and was clear, coherent, and relevant. Mood was euthymic with congruent affect. Thought processes were relevant, logical and goal-directed. Thought content was WNL with no evidence of psychotic or paranoid features. No evidence of SI/HI or self-harm, intent, or plans. Memory appeared grossly intact. Insight and judgment appeared good and patient exhibited appropriate impulse control during the appointment.     Does the patient appear to be at imminent risk of harm to self/others at this time? No    The session was necessary to address problematic symptoms of anxiety and panic attacks that have been interfering with patient's ability to function in daily life.  Ongoing psychotherapy is necessary to improve functioning with daily activities, provide psychoeducation and provide support.    Diagnosis (DSM-5):  Generalized anxiety disorder with panic attacks    Plan:  1.  Follow up in 2 weeks.  2. Work on goals as noted in session - Breathing retraining handout provided and reviewed during session.  3. Utilize crisis resources as needed.      NOTE: Treatment plan update due at next session.  Diagnostic assessment update due 12/11/2020.    Stephie Ayala, PhD.  Behavioral Health Fellow

## 2020-01-23 ENCOUNTER — OFFICE VISIT (OUTPATIENT)
Dept: PSYCHOLOGY | Facility: CLINIC | Age: 30
End: 2020-01-23
Payer: COMMERCIAL

## 2020-01-23 VITALS
SYSTOLIC BLOOD PRESSURE: 128 MMHG | OXYGEN SATURATION: 96 % | DIASTOLIC BLOOD PRESSURE: 76 MMHG | WEIGHT: 229.2 LBS | BODY MASS INDEX: 32.09 KG/M2 | RESPIRATION RATE: 16 BRPM | HEART RATE: 75 BPM

## 2020-01-23 DIAGNOSIS — R61 HYPERHIDROSIS: ICD-10-CM

## 2020-01-23 DIAGNOSIS — K76.0 FATTY LIVER: Primary | ICD-10-CM

## 2020-01-23 DIAGNOSIS — F90.9 ATTENTION DEFICIT HYPERACTIVITY DISORDER (ADHD), UNSPECIFIED ADHD TYPE: ICD-10-CM

## 2020-01-23 LAB
ALBUMIN SERPL-MCNC: 4.4 G/DL (ref 3.4–5)
ALP SERPL-CCNC: 60 U/L (ref 40–150)
ALT SERPL W P-5'-P-CCNC: 22 U/L (ref 0–70)
AST SERPL W P-5'-P-CCNC: 13 U/L (ref 0–45)
BILIRUB DIRECT SERPL-MCNC: 0.2 MG/DL (ref 0–0.2)
BILIRUB SERPL-MCNC: 0.6 MG/DL (ref 0.2–1.3)
PROT SERPL-MCNC: 7.4 G/DL (ref 6.8–8.8)

## 2020-01-23 RX ORDER — ALUMINUM CHLORIDE 20 %
SOLUTION, NON-ORAL TOPICAL
Qty: 60 ML | Refills: 11 | Status: SHIPPED | OUTPATIENT
Start: 2020-01-23 | End: 2021-02-05

## 2020-01-23 RX ORDER — ATOMOXETINE 25 MG/1
25 CAPSULE ORAL DAILY
Qty: 30 CAPSULE | Refills: 0 | Status: SHIPPED | OUTPATIENT
Start: 2020-01-23 | End: 2020-02-10

## 2020-01-24 NOTE — PROGRESS NOTES
"    [facility]  PSYCHIATRY OUTPATIENT CONSULT        CARE TEAM:  Bulmaro Packer MD.               Justin Morales is a 29 year old pt who uses the name Justin & pronouns him, he, his.    Referred by:  PCP Dr. Packer   Referral Question:  Make recommendations for anxiety and ADHD.  History Provided by:  patient who was a good historian.     I spent 60 minutes face to face time with the pt, greater than 50% in counseling and care coordination. Counseling and care coordination was around management of generalized anxiety, panic attacks and ADHD.      DIAGNOSES                  - Panic Disorder   - Attention deficit hyperactivity disorder  (ADHD)  - Anxiety, unspecified   - previously diagnosed with Generalized Anxiety Disorder (EVIE)    ASSESSMENT   [m2, h3]          This 30 yo patient has been previously diagnosed with EVIE, 'panic attacks' and ADHD, now referred for recommendations for worsening anxiety over the last 6 months. Historically (ages [age]), ADHD presented with \"anxiety\" and was well managed with Adderall 20 mg/day for at least 3 years. Ultimately, medication was discontinued as he was able to control his symptoms with coping skills.Over the last 6 months panic attacks have increased in frequency to 2-3 times/week, higher baseline anxiety, he is putting more effort towards maintaining his level of functioning and coping skills are working less well. Does not endorse being a worrier at baseline, no depression or suicidal ideation. Adderall   was restarted in 11/2019 but he experienced lot of side effects and consequently stopped it (it is possible that the starting dose of 20mg was too high for him). Recent stressors include: financial security in his current job and newly  in 2019. It remains unclear whether the worsening anxiety is a distinct problem and/or secondary to untreated ADHD. It reasonable, at this time, to treat the patient's ADHD and observe if " "anxiety reduces. We will avoid Adderall for now, instead will try atomoxetine for ADHD.  Today will check hepatic panel prior to starting the medication given the recent LFT elevation (which had corrected, will double check on that). We recommend the patient continue to follow with his PCP and   therapist for follow up, unless his symptoms are not controlled with the above plan. tomoxetine  RECOMMENDATIONS    [m2, h3]                                               1) Medications:  [after today, all med related issues should be directed to PCP]  - start atomoxetine 25 mg po day  - remain off Adderall   - if atomoxetine fails, consider lower dose of Adderall or a methylphenidate product    2) [place]  was not checked today:  not using controlled substances.    3) Other:    Therapy- Continue psychotherapy for anxiety managment    Consider therapy for ADHD  Order hepatic panel today    4) RTC:  Pt will follow-up with PCP and return for another consult with us if needed    5) CRISIS Numbers:  If needed in the future, would give:  [facility] - [phone]  COPE 24/7 [facility] Mobile Team for Adults [[phone]]  [facility]:  [[phone]]   CRISIS TEXT LINE: Text [phone] from anywhere, anytime, any crisis 24/7     CHIEF COMPLAINT                                       \" I am having more panic attacks \"     PERTINENT BACKGROUND     Justin stated that he was diagnosed with ADAH and anxiety at age of 20. He reported that prior to that he has period of time when he felt anxious since he was 13 or 13 yo but he was not diagnosed at that time. Justin stated that he had shoulder surgery at that time and he had an episode in the OR when he was very anxious about the IV and PIC line placement. He stated that at that time he was very fearful of the procedure and his heart rate was elevated at 160's and he was told by the anesthesiologist with panic attack. He recall after that he " underwent further testing ( 4 hours testing) that concluded he had ADHD. Patient has been seeing therapist in the past 6 months and have been working of coping skills that is helping somewhat.       Psych critical item history: Include being on Adderall 20 mg po between 20- 26 yo.    HISTORY OF PRESENT ILLNESS     [4, 4]     Most recent history began about 2 years ago around 2017. Patient stated that he stopped taking his Adderall about 2 years ago due to lack of efficacy. He stated that he felt that it has helped him mainly in the first 2 years that he took it and then  He started feeling that the affect has plaetued. He stated that the frequency of his panic attack were decreased remarkably, he stated that in the period of 5 years ( between the age of [age] ) he has only 2-3 panic attacks. Patient stated that after he stopped the Adderall, He has remarkable decline in his libido and felt over all physically extubated; however he noted that his libido has recovered about 60%. Patient stated that his anxiety and panic attack has worsened in the past year and particularly in the past 6 months. Patient stated that over the past 6 month he has been having panic attacks at least 2-3 times/ weekly. Patient stated that his PCP restarted the Adderall 20 mg po /day back again last November. Patient reported that he did have severe side effects including feeling cloudiness in his thoughts, feeling jittery and felt on the edge all the time. Consequently patient stopped the medication after 10 days which resulted in total resolution of the side effects.      Regarding his Panic attacks, patient reported that recently in the past 6 months he has been walking up in the middle of the night with panic attack. He stated feeling fearful for his life during these episodes, increased heart rates ( up 140-160), diaphoresis, feeling muscle intensity, hyperventilation. He stated that he usually use deep breathing techniques and muscle  "relaxation. He is unable to identify major triggers for these attacks, although he is describing them \" rational most of times due to the  Situation\". Patient described recent panic attack when he is wither in airplane or in the back seat of a car and he stated that this NEW  To him. When asked if he consider him self a worrier or if anyone else pointed out to him that he worries so much, he replied\" diffinently not a worrier\".     Regarding his ADHD, was started on Adderall at age of 20, he described initial benefit from the Adderrall, as he was able to get his life in organized, felt like he was managing things, he felt his actions and his mind were less chaotic, had less trouble with mental hyperactivity, was sleeping better, not as anxious about things. Later he started to have sexual side effects of the Adderall ( decreased libido. He stated that he was not consulted regarding this specific side effects. He stated that after talking to multiple providers, he decided to stop taking the medication.  Patient stated that it is difficult for him to differentiate between his anxiety and ADHD. He reported that if he is anxious usually he is unable to function well.      Patient reported that his current work situation is major stressor, he stated that he feels the financial uncertainty has been getting worse in the past 2 years. He feels like his panic attacks and anxiety have worsened as the stressors associated with his program have worsened. He sated that he got  1 years ago, although he stated that he has stable, positive relationship with his wife, he also feels it new change and it added to his overall stress.    Current Social History:  Financial Support/ Work - working  Living Situation/Partner/- in 2019  Children - no  Social/ Spiritual Support - None      Legal - denies   Feels safe at home - yes    PSYCH ROS:   Depression: Denied symptoms of depression. Endorsed some difficulties with sleep or " feeling tired which he attributed to long work schedule and stress. Denies  suicidal ideation and anhedonia  Elevated:  Denies  increased energy, increased activity, grandiosity, pressured speech, excessive spending, excessive pleasure seeking and excessive risk taking  Psychosis: Denies   delusions, auditory hallucinations, visual hallucinations and disorganized behavior  Anxiety:  panic attacks [as decribed above ], distract ability, racing thoughts   Trauma Related:  Denies   Dysregulation: Denies  suicidal ideation, violent ideation and SIB  Eating Disorder: was not assessed during this visit.    RECENT SUBSTANCE USE:  Alcohol- reports only social drinking about 2-3 drinks twice/ week. Denies any h/o of binge drinking or blacking out after drinking. He also denies any history of sever withdrawal (DTs or Seizures)     Pertinent Negative Symptoms:    NO suicidal ideation, violent ideation, self-injurious behavior/urges, psychosis, hallucinations, delusions and zhang.        PAST PSYCH and SUBSTANCE HISTORY                      PSYCH:  No additional psychiatric history.    SUBSTANCE:  No additional substance use history.    PAST SOCIAL FAMILY HISTORY   [1ea, 1ea]           [per patient report]            No additional social history. Social history is documented above  No family history of bipolar illness or schizophrenia    MEDICAL / SURGICAL HISTORY        Patient Active Problem List   Diagnosis     ADHD (attention deficit hyperactivity disorder)     Anxiety     MEDICAL REVIEW OF SYSTEMS    [2, 10]     A comprehensive review of systems was performed and is negative other than noted in the HPI.    ALLERGY    Patient has no known allergies.  CURRENT MEDS       Current Outpatient Medications   Medication Sig Dispense Refill     ADDERALL XR 20 MG 24 hr capsule TK ONE C PO QD  0     DRYSOL 20 % external solution APPLY TOPICALLY EVERY EVENING.  11     hydrOXYzine (ATARAX) 25 MG tablet Take 1 tablet (25 mg) by mouth  every 4 hours as needed for itching 30 tablet 0     VITALS       [3, 3]   /76   Pulse 75   Resp 16   Wt 104 kg (229 lb 3.2 oz)   SpO2 96%   BMI 32.09 kg/m    MENTAL STATUS EXAM     [9, 14 cog gs]     Alertness: alert  and oriented to place, time, place and situation   Appearance: well groomed  Behavior/Demeanor: cooperative and pleasant, with good  eye contact   Speech: regular rate and rhythm  Language: intact  Psychomotor: normal or unremarkable  Mood: anxious  Affect: full range; was congruent to mood; was congruent to content  Thought Process/Associations:  Logical , linear goal directed   Thought Content:  Reports none;  Denies suicidal ideation, violent ideation, delusions, preoccupations, magical thinking, over-valued ideas and paranoid ideation  Perception:  Reports none;  Denies auditory hallucinations, visual hallucinations, depersonalization and derealization  Insight: good  Judgment: good  Cognition: (6) oriented: time, person, and place  attention span: intact  concentration: intact  recent memory: intact  remote memory: intact  fund of knowledge: appropriate  Gait and Station: unremarkable     LABS and DATA       PHQ-9 SCORE 12/11/2019   PHQ-9 Total Score 2     Haptic panel ordered on 1/23/2020 and was WNL     PSYCHOTROPIC DRUG INTERACTIONS     Was not discussed today as the patient is not on any psychotropic medications   RISK STATEMENT for SAFETY     Justin Morales did not appear to be an imminent safety risk to self or others.    STATEMENTS REGARDING TREATMENT RISK and CONSULT PROCESS      TREATMENT RISK STATEMENT:  The risks, benefits, alternatives and potential adverse effects have been explained and are understood by the pt. The pt understands the risks of using street drugs or alcohol.  The pt agrees to the treatment plan with the ability to do so. The pt knows to call the clinic for any problems or to access emergency care if needed.  Medical and substance use concerns/history are  documented above.  Psychotropic drug interaction check was done, including changes made today, and is discussed above.     STATEMENT REGARDING CONSULT:  Intervention decisions emerging from this consult will be either made by the PCP or initiated today in agreement with PCP.  Note, this is a one time consult only.  No psychiatry follow-up will be provided. PCP is encouraged to contact this consultant if future assistance is desired.    COUNSELING AND COORDINATION OF CARE CONSISTED OF:  Diagnosis, impressions, risk and benefits of treatment options, instructions for treatment and follow up and plan for additional supporting services.      RESIDENT PHYSICIAN:  Jaime Marr MD     ATTENDING PHYSICIAN:  Juany Paiz MD     I, Dr Howard , I  am scribing for and in the presence of Dr. Juany Paiz, attending.  I, Dr. Paiz as the attending doctor, have reviewed and edited the documentation recorded by Dr. Marr. The documentation accurately reflects the services I personally performed and the treatment decisions made by me.    Juany Paiz MD

## 2020-02-05 ENCOUNTER — OFFICE VISIT (OUTPATIENT)
Dept: PSYCHOLOGY | Facility: CLINIC | Age: 30
End: 2020-02-05
Payer: COMMERCIAL

## 2020-02-05 DIAGNOSIS — F41.1 GENERALIZED ANXIETY DISORDER WITH PANIC ATTACKS: Primary | ICD-10-CM

## 2020-02-05 DIAGNOSIS — F41.0 GENERALIZED ANXIETY DISORDER WITH PANIC ATTACKS: Primary | ICD-10-CM

## 2020-02-05 NOTE — PROGRESS NOTES
Behavioral Health Progress Note    Client Legal Name: Justin Morales   Client Preferred Name: Justin   Service Type: Individual  Length of Visit: 40 minutes  Attendees: patient       Identifying Information and Presenting Problem:    The patient is a 29 year old  male who is being seen for problematic symptoms of anxiety and panic attacks.    Treatment Objective(s) Addressed in This Session:  Anxiety: will develop more effective coping skills to manage anxiety symptoms, will develop healthy cognitive patterns and beliefs and will increase ability to function adaptively    Progress on / Status of Treatment Objective(s) / Homework:  Satisfactory progress     PHQ-9 SCORE 12/11/2019   PHQ-9 Total Score 2       EVIE-7 SCORE 12/11/2019   Total Score 9       Topics Discussed/Interventions Provided:  Justin met with Dr. Paiz on 1/23/19 for medication consultation. He started atomoxetine (Strattera) at that time. Reported reduced anxiety, but said that for that period of time he could not get any work done. Felt drowsy and sluggish, with difficulty focusing and with motivation. Some slight stomach discomfort. Felt like he was off. He stopped the medication today. Plans to discuss with Dr. Reyes at their visit next week. Doesn't know what next steps would be. Noted that I would let Dr. Reyes know ahead of time of his response to medication which he appreciated.    Justin said he did go to work and attempted working but could not focus enough. 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 His goal is to write at least 1500 words per week. His advisor is traveling for interviews which helps relieve some of the pressure of not having done much work. Validated and provided empathic support. Reviewed some of the thought processes involved when feeling anxious about dissertation. Identified string of negative value judgments that exacerbate anxiety.  "    Introduced and provided psychoeducation on cognitive defusion and mindfulness. We talked about unhooking from his thoughts to reduce anxious response. Explained concepts of mindfulness and importance of continued practice. Practiced mindfulness exercise in session (leaves on a stream) and processed response to intervention. Reported he felt relaxed and surprised at feeling better. Exercise was hard to visualize, but he was able to identify and let thoughts go. Noted distinction between thoughts, feelings, and sensations. We talked about the distinction between mindfulness and deep breathing relaxation. Justin felt he'd like to continue practicing mindfulness during the week.     Assessment: The patient appeared to be active and engaged in today's session and was receptive to feedback.      Mental Status: Justin appeared generally alert and oriented. Dress was casual and appropriate to the weather and occasion. Grooming and hygiene were good. Eye contact was good. Speech was of normal volume and rate and was clear, coherent, and relevant. Mood was somewhat agitated (\"feel off\") with congruent affect. Thought processes were relevant, logical and goal-directed. Thought content was WNL with no evidence of psychotic or paranoid features. No evidence of SI/HI or self-harm, intent, or plans. Memory appeared grossly intact. Insight and judgment appeared good and patient exhibited appropriate impulse control during the appointment.      Does the patient appear to be at imminent risk of harm to self/others at this time? No     The session was necessary to address problematic symptoms of anxiety and panic attacks that have been interfering with patient's ability to function in daily life.  Ongoing psychotherapy is necessary to improve functioning with daily activities, provide psychoeducation and provide support.     Diagnosis (DSM-5):  Generalized anxiety disorder with panic attacks     Plan:  1. Follow up in 2 " weeks.  2. Justin will read cognitive defusion/mindfulness handout. He agreed to practice mindfulness exercises with a daily goal of a 5-10 minutes.   3. Justin has a follow-up appointment with Dr. Reyes on 2/10/2020. Will discuss medication side effects and alternatives then.         NOTE: Treatment plan update due at next session.  Diagnostic assessment update due 12/11/2020.     Stephie Ayala, PhD.  Behavioral Health Fellow

## 2020-02-05 NOTE — Clinical Note
Hi Dr. Reyes and Dr. Paiz, just an FYI on this patient that I met with yesterday. He stopped taking the strattera due to not liking its effects. He plans to attend his appointment with Dr. Reyes next week to discuss this and other options. Read my note for further details and let me know if I can be of any help or if you have any questions. Thanks! - Kim MUNOZ

## 2020-02-05 NOTE — PATIENT INSTRUCTIONS
Thank you for coming in today.    Just a reminder that if you experience an increase in symptoms or feel you are in crisis, you can call the clinic number ([phone]).      If you are having an acute psychiatric emergency, please call 911 or have someone drive you directly to [facility] for further evaluation.  The homework/goals we discussed today are: Read the cognitive defusion handout and practice mindfulness - a little everyday helps  Please follow-up in 2 weeks.  Again thank you for choosing [facility] and please let us know how we can best partner with you to improve your and your family's health.

## 2020-02-07 NOTE — PROGRESS NOTES
Preceptor Attestation:  I have reviewed and agree with the behavioral health fellow's documentation for this visit.  I did not see the patient.  Supervising Clinical Psychologist:  Edith Crotés PSYD LP

## 2020-02-09 NOTE — PROGRESS NOTES
"       HPI       Justin Morales is a 29 year old  who presents for   Chief Complaint   Patient presents with     RECHECK     Saw Dr. Paiz 2 weeks ago and was given Strattera, worked well for the first week then after that other side effects popped up so he stopped taking it     1st week on strattera - anxiety went away. Effect immediate.   2nd week - felt distant, \"far away from myself\" while doing things - got more frightening. Got nothing done at work. Dissociation, depersonalization. Fatigued, less energetic - now feels fine.     Couldn't sit down and write. Sit at computer, staring. Writing zero words from 1500.  No issues with concentration - but more like creativity gone.    Baseline anxiety low, then panic spikes.  No panic attacks while taking Strattera. After stopping - not yet had panic attacks.    In past on Prozac for a few months. Dreams were black and white.  Was prescribed for anxiety.     EVIE-7 SCORE 12/11/2019 2/10/2020   Total Score 9 4     PHQ-9 SCORE 12/11/2019 2/10/2020   PHQ-9 Total Score 2 6       No to wellbutrin.  May - may improve given work situation change.   Sessions with Jamie going well.  Doing all the right stuff. Cutting out alcohol, doing meal prepping. Going to gym more.  Would like to avoid meds.    Sleep schedule regular. In last 2 weeks no panic episodes during sleep. Mindfulness prior helpful. Sleep better than it has been in a long time.    Back pain  Weight-lifting injury.  Tight hamstrings per PT in past.   Now back flared up as going to gym more.  Sore SI joint.  No paresthesias.  Wants PT referral.    Problem, Medication and Allergy Lists were reviewed and updated if needed..    Patient is an established patient of this clinic..         Review of Systems:   Review of Systems   Constitutional: Positive for fatigue (improved).   Eyes: Negative for visual disturbance.   Gastrointestinal: Negative for abdominal pain.   Musculoskeletal: Positive for back pain. " "  Neurological: Negative for weakness and headaches.   Psychiatric/Behavioral: Positive for confusion. Negative for decreased concentration, dysphoric mood, self-injury and sleep disturbance.        Dissociation             Physical Exam:     Vitals:    02/10/20 1559 02/10/20 1604   BP: (!) 142/94 119/82   BP Location: Left arm Right arm   Patient Position: Sitting Sitting   Cuff Size: Adult Regular Adult Regular   Pulse: 71    Resp: 16    Temp: 98  F (36.7  C)    TempSrc: Oral    SpO2: 97%    Weight: 104.5 kg (230 lb 6.4 oz)    Height: 1.795 m (5' 10.67\")      Body mass index is 32.44 kg/m .  Vitals were reviewed and were normal     Physical Exam  Constitutional:       General: He is not in acute distress.     Appearance: He is well-developed. He is not diaphoretic.   HENT:      Head: Normocephalic and atraumatic.   Eyes:      Conjunctiva/sclera: Conjunctivae normal.   Neck:      Musculoskeletal: Normal range of motion.   Cardiovascular:      Rate and Rhythm: Normal rate and regular rhythm.   Pulmonary:      Effort: Pulmonary effort is normal. No respiratory distress.   Musculoskeletal: Normal range of motion.   Neurological:      General: No focal deficit present.      Mental Status: He is alert.   Psychiatric:         Mood and Affect: Mood normal. Mood is not anxious or depressed. Affect is not labile.         Behavior: Behavior normal.         Thought Content: Thought content normal.         Cognition and Memory: Cognition and memory normal.         Judgment: Judgment normal.      Comments: Affect much more bright           Results:   No testing ordered today    Assessment and Plan        Justin was seen today for recheck.    Diagnoses and all orders for this visit:    Attention deficit hyperactivity disorder (ADHD), other type  Anxiety  Consulted with Dr. Paiz 1/23 - decision made to target ADHD symptoms. Therapeutic trial straterra in 1st week was effective in decreasing anxiety but significant issues with " depersonalization, fatigue, etc per above. Since discontinued. Therapy sessions going well. Discussed other options to manage ADHD outside of stimulants and strattera. Discussed wellbutrin which likely would not be ideal given patient's anxiety. Overall patient preferring to do a trial off medications as currently at a good baseline level of functioning, and he strongly feels that current symptoms are contingent upon stress with his current work/education situation. Will let us know if he reconsiders medications. Other options we can consider is clonidine, guanfacine. Discussed these with patient as well. Overall symptoms (panic, fatigue, anxiety) improving. If ongoing or increase in symptoms of palpitations, flushing, etc consider ddx of thyroid d/o, pheochromocytoma and work-up accordingly.    Strain of lumbar region, sequela  No red-flag symptoms. Related to prior lift injury.  Referral to PT placed  -     [facility] - [facility]; Future         Medications Discontinued During This Encounter   Medication Reason     ADDERALL XR 20 MG 24 hr capsule      atomoxetine (STRATTERA) 25 MG capsule        Options for treatment and follow-up care were reviewed with the patient. Justin Morales  engaged in the decision making process and verbalized understanding of the options discussed and agreed with the final plan.    Dana Reyes MD

## 2020-02-10 ENCOUNTER — OFFICE VISIT (OUTPATIENT)
Dept: FAMILY MEDICINE | Facility: CLINIC | Age: 30
End: 2020-02-10
Payer: COMMERCIAL

## 2020-02-10 VITALS
DIASTOLIC BLOOD PRESSURE: 82 MMHG | RESPIRATION RATE: 16 BRPM | WEIGHT: 230.4 LBS | HEART RATE: 71 BPM | OXYGEN SATURATION: 97 % | HEIGHT: 71 IN | BODY MASS INDEX: 32.26 KG/M2 | SYSTOLIC BLOOD PRESSURE: 119 MMHG | TEMPERATURE: 98 F

## 2020-02-10 DIAGNOSIS — F41.9 ANXIETY: ICD-10-CM

## 2020-02-10 DIAGNOSIS — S39.012S STRAIN OF LUMBAR REGION, SEQUELA: ICD-10-CM

## 2020-02-10 DIAGNOSIS — F90.8 ATTENTION DEFICIT HYPERACTIVITY DISORDER (ADHD), OTHER TYPE: Primary | ICD-10-CM

## 2020-02-10 RX ORDER — RIBOFLAVIN (VITAMIN B2) 100 MG
100 TABLET ORAL 3 TIMES DAILY
COMMUNITY
End: 2021-03-15

## 2020-02-10 RX ORDER — CHLORAL HYDRATE 500 MG
2 CAPSULE ORAL DAILY
COMMUNITY
End: 2022-03-30

## 2020-02-10 ASSESSMENT — ENCOUNTER SYMPTOMS
ABDOMINAL PAIN: 0
DYSPHORIC MOOD: 0
FATIGUE: 1
WEAKNESS: 0
BACK PAIN: 1
DECREASED CONCENTRATION: 0
HEADACHES: 0
SLEEP DISTURBANCE: 0
CONFUSION: 1

## 2020-02-10 ASSESSMENT — ANXIETY QUESTIONNAIRES
5. BEING SO RESTLESS THAT IT IS HARD TO SIT STILL: SEVERAL DAYS
1. FEELING NERVOUS, ANXIOUS, OR ON EDGE: SEVERAL DAYS
6. BECOMING EASILY ANNOYED OR IRRITABLE: NOT AT ALL
IF YOU CHECKED OFF ANY PROBLEMS ON THIS QUESTIONNAIRE, HOW DIFFICULT HAVE THESE PROBLEMS MADE IT FOR YOU TO DO YOUR WORK, TAKE CARE OF THINGS AT HOME, OR GET ALONG WITH OTHER PEOPLE: SOMEWHAT DIFFICULT
3. WORRYING TOO MUCH ABOUT DIFFERENT THINGS: NOT AT ALL
GAD7 TOTAL SCORE: 4
7. FEELING AFRAID AS IF SOMETHING AWFUL MIGHT HAPPEN: NOT AT ALL
2. NOT BEING ABLE TO STOP OR CONTROL WORRYING: SEVERAL DAYS

## 2020-02-10 ASSESSMENT — PATIENT HEALTH QUESTIONNAIRE - PHQ9
SUM OF ALL RESPONSES TO PHQ QUESTIONS 1-9: 6
5. POOR APPETITE OR OVEREATING: SEVERAL DAYS

## 2020-02-10 ASSESSMENT — MIFFLIN-ST. JEOR: SCORE: 2036.96

## 2020-02-11 ASSESSMENT — ANXIETY QUESTIONNAIRES: GAD7 TOTAL SCORE: 4

## 2020-02-11 NOTE — PROGRESS NOTES
Preceptor Attestation:   Patient seen, evaluated and discussed with the resident. I have verified the content of the note, which accurately reflects my assessment of the patient and the plan of care.   Supervising Physician:  Cheko Monroe MD

## 2020-02-19 ENCOUNTER — OFFICE VISIT (OUTPATIENT)
Dept: PSYCHOLOGY | Facility: CLINIC | Age: 30
End: 2020-02-19
Payer: COMMERCIAL

## 2020-02-19 DIAGNOSIS — F41.1 GENERALIZED ANXIETY DISORDER WITH PANIC ATTACKS: Primary | ICD-10-CM

## 2020-02-19 DIAGNOSIS — F41.0 GENERALIZED ANXIETY DISORDER WITH PANIC ATTACKS: Primary | ICD-10-CM

## 2020-02-19 NOTE — PATIENT INSTRUCTIONS
Helpful Thoughts:    I'm accepting that this is the way this is and there is only so much I can control    I will focus on what I can change and control    Take a step back, notice when I'm catastrophizing, defuse the situation    The frustration I have is valid, and dwelling on it does not help me    I remind myself that when I am in my building, this academic culture amplifies the bad feelings, but I also know that this is not the perspective of the world outside. It's not that big a deal     I remind myself that I will be okay. There are other ways I can succeed.    My life is going really well and there are many things I am happy about, I just need to get through this moment    Keep doing mindfulness and breathing. This helps me.     Treatment Plan    Client's Legal Name: Justin Morales    Client's Preferred Name:Justin  YOB: 1990  Today's Date: February 19, 2020    Date Diagnostic Update Due: 12/11/2020    DSM-V Diagnoses:  Generalized anxiety disorder with panic attacks    Psychosocial / Contextual Factors:   The patient's mental health concerns have been continuing to affect his ability to function at school and work and have been causing clinically significant distress.    PC-PTSD: 0 /4  CAGE AID: 1 /4  PHQ-9 SCORE 12/11/2019 2/10/2020   PHQ-9 Total Score 2 6     EVIE-7 SCORE 12/11/2019 2/10/2020   Total Score 9 4       Collaboration: Dana Reyes (PCP) and Dr. 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 Will work to manage anxiety effectively and reduce panic attacks throughout process.     Steps we will take to achieve your goal:    Justin will attend therapy and learn stress management strategies including relaxation and  mindfulness for panic attacks, and CBT strategies to manage distressing thoughts.     Intervention(s)  Therapist will provide support, psychoeducation and homework assignments as needed.      If you need additional support and care during times that your therapist or PCP are not available, here are some additional resources for you:    Help in a Crisis:    [facility] ([facility] Mobile Response Team):  [phone]   [facility] Multilingual Crisis Line:  [phone]    [facility]: [phone]    (Emergency Psychiatric Evaluation)    Acute Psychiatric Services - [facility]  24 hour crisis walk-in and crisis line  [address] [place], [place]  [phone]    Crisis Text Line: Text [place] to 568702. Free support at your fingertips 24/7  People who text [place] to 661332 will be connected with a counselor. Crisis Text Line is available 24 hours a day, seven days a week.    If you feel at risk of immediate harm, go directly to the Emergency Department.    Patient has reviewed and agreed to the above plan.    Stephie Ayala, PhD  February 19, 2020      ______________________________    ________  Patient Signature       Date    ______________________________    ________  Provider Signature       Date    ______________________________                ________  Supervisor Co-Signature      Date

## 2020-02-19 NOTE — PROGRESS NOTES
"Behavioral Health Progress Note    Client Legal Name: Justin Morales   Client Preferred Name: Justin   Service Type: Individual  Length of Visit: 40 minutes  Attendees: patient       Identifying Information and Presenting Problem:    The patient is a 29 year old  male who is being seen for problematic symptoms of anxiety and panic attacks.    Treatment Objective(s) Addressed in This Session:  Anxiety: will experience a reduction in anxiety, will develop more effective coping skills to manage anxiety symptoms, will develop healthy cognitive patterns and beliefs and will increase ability to function adaptively    Progress on / Status of Treatment Objective(s) / Homework:  Satisfactory progress     PHQ-9 SCORE 12/11/2019 2/10/2020   PHQ-9 Total Score 2 6       EVIE-7 SCORE 12/11/2019 2/10/2020   Total Score 9 4       Topics Discussed/Interventions Provided:    Treatment plan: Collaboratively created and signed today. Goal is to manage anxiety/panic attacks and defend dissertation (hopefully May/June).     Visit with Dr. Reyes: Decided on no medications at the moment and managing anxiety through coping strategies. Side effects of medication have receded. Is able to focus and work now. \"If ongoing or increase in symptoms of palpitations, flushing, etc consider ddx of thyroid d/o, pheochromocytoma and work-up accordingly.\" Will continue to monitor.    Reviewed HW: Justin has been practicing mindfulness and relaxation daily. Found phone applications that he likes. Mindfulness exercises before sleeps. Helps calm down racing thoughts/stress. Has mostly slept soundly which is a great improvement. Did have 1 night of waking up with heart palpitations. Difficulty bringing down heart racing through breathing. Took hydroxyzine for calming down and sleeping. This helped. No panic attacks in the past two weeks. Is trying to add a mindfulness exercise to mid-day routine when having lunch.     Dissertation progress and " "coping: Has a big presentation of his progress next Monday to other students and faculty. His committee will be there. This is stressful, but feels like he's not spiraling as he did in the past. Working on defusing thoughts and noticing when he's catastrophizing. Reinforced thought challenging and defusion strategies. Guided through exercise of identifying situation, thoughts, and alternative helpful thoughts.     Assessment: The patient appeared to be active and engaged in today's session and was receptive to feedback.      Mental Status: Justin appeared generally alert and oriented. Dress was casual and appropriate to the weather and occasion. Grooming and hygiene were good. Eye contact was good. Speech was of normal volume and rate and was clear, coherent, and relevant. Mood was somewhat agitated (\"feel off\") with congruent affect. Thought processes were relevant, logical and goal-directed. Thought content was WNL with no evidence of psychotic or paranoid features. No evidence of SI/HI or self-harm, intent, or plans. Memory appeared grossly intact. Insight and judgment appeared good and patient exhibited appropriate impulse control during the appointment.      Does the patient appear to be at imminent risk of harm to self/others at this time? No     The session was necessary to address problematic symptoms of anxiety and panic attacks that have been interfering with patient's ability to function in daily life.  Ongoing psychotherapy is necessary to improve functioning with daily activities, provide psychoeducation and provide support.     Diagnosis (DSM-5):  Generalized anxiety disorder with panic attacks     Plan:  1. Follow up in 2 weeks.  2. Justin will continue practicing mindfulness exercises with a daily goal of a 15-30 minutes.   3. Follow-up with PCP as instructed.  4. Follow-up with PT referral due to prior lift injury. Confirmed appointments scheduled.      NOTE: Treatment plan update " due 05/19/2020.  Diagnostic assessment update due 12/11/2020.     Stephie Ayala, PhD.  Behavioral Health Fellow

## 2020-02-20 ENCOUNTER — THERAPY VISIT (OUTPATIENT)
Dept: PHYSICAL THERAPY | Facility: CLINIC | Age: 30
End: 2020-02-20
Attending: FAMILY MEDICINE
Payer: COMMERCIAL

## 2020-02-20 DIAGNOSIS — M54.50 CHRONIC BILATERAL LOW BACK PAIN WITHOUT SCIATICA: ICD-10-CM

## 2020-02-20 DIAGNOSIS — S39.012S STRAIN OF LUMBAR REGION, SEQUELA: ICD-10-CM

## 2020-02-20 DIAGNOSIS — G89.29 CHRONIC BILATERAL LOW BACK PAIN WITHOUT SCIATICA: ICD-10-CM

## 2020-02-20 PROCEDURE — 97161 PT EVAL LOW COMPLEX 20 MIN: CPT | Mod: GP | Performed by: PHYSICAL THERAPIST

## 2020-02-20 PROCEDURE — 97110 THERAPEUTIC EXERCISES: CPT | Mod: GP | Performed by: PHYSICAL THERAPIST

## 2020-02-20 PROCEDURE — 97530 THERAPEUTIC ACTIVITIES: CPT | Mod: GP | Performed by: PHYSICAL THERAPIST

## 2020-02-20 ASSESSMENT — ACTIVITIES OF DAILY LIVING (ADL)
STIFFNESS: I HAVE THE SYMPTOM BUT IT DOES NOT AFFECT MY ACTIVITY
KNEE_ACTIVITY_OF_DAILY_LIVING_SUM: 62
HOW_WOULD_YOU_RATE_THE_CURRENT_FUNCTION_OF_YOUR_KNEE_DURING_YOUR_USUAL_DAILY_ACTIVITIES_ON_A_SCALE_FROM_0_TO_100_WITH_100_BEING_YOUR_LEVEL_OF_KNEE_FUNCTION_PRIOR_TO_YOUR_INJURY_AND_0_BEING_THE_INABILITY_TO_PERFORM_ANY_OF_YOUR_USUAL_DAILY_ACTIVITIES?: 85
SQUAT: ACTIVITY IS SOMEWHAT DIFFICULT
RISE FROM A CHAIR: ACTIVITY IS NOT DIFFICULT
GO UP STAIRS: ACTIVITY IS NOT DIFFICULT
LIMPING: I HAVE THE SYMPTOM BUT IT DOES NOT AFFECT MY ACTIVITY
PAIN: I HAVE THE SYMPTOM BUT IT DOES NOT AFFECT MY ACTIVITY
KNEEL ON THE FRONT OF YOUR KNEE: ACTIVITY IS NOT DIFFICULT
AS_A_RESULT_OF_YOUR_KNEE_INJURY,_HOW_WOULD_YOU_RATE_YOUR_CURRENT_LEVEL_OF_DAILY_ACTIVITY?: NEARLY NORMAL
GO DOWN STAIRS: ACTIVITY IS NOT DIFFICULT
SIT WITH YOUR KNEE BENT: ACTIVITY IS NOT DIFFICULT
HOW_WOULD_YOU_RATE_THE_OVERALL_FUNCTION_OF_YOUR_KNEE_DURING_YOUR_USUAL_DAILY_ACTIVITIES?: NEARLY NORMAL
STAND: ACTIVITY IS MINIMALLY DIFFICULT
SWELLING: I DO NOT HAVE THE SYMPTOM
WEAKNESS: I DO NOT HAVE THE SYMPTOM
RAW_SCORE: 62
GIVING WAY, BUCKLING OR SHIFTING OF KNEE: I HAVE THE SYMPTOM BUT IT DOES NOT AFFECT MY ACTIVITY
WALK: ACTIVITY IS MINIMALLY DIFFICULT
KNEE_ACTIVITY_OF_DAILY_LIVING_SCORE: 88.57

## 2020-02-20 NOTE — PROGRESS NOTES
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 Is stretching them with stretches from a PT.      Symptom Behavior Details    Primary Symptoms Sporadic symptoms; Activity/position dependent, pain (Location: Bilateral low back pain, denies pain into the legs, Quality: Aching/Throbbing), stiffness, weakness \"I do something to it and then it stays for a few days\" Will do something that tweaks it and then is in bed for a few days.    Worst Pain 8/10 (with unknown reasons - it builds with activities)   Symptom Provocators Bending forward  Bending + twisting  Leg press   Best Pain 0/10    Symptom Relievers Activity modification   Time of day dependent? No   Recent symptom change? no change in symptoms     Prior Testing/Intervention for current condition:  Prior Tests None   Prior Treatment PT      Lifestyle & General Medical History:  Employment Lab work at [employer]   Usual physical activities  (within past year) Cardio x 30 minutes  Weight lifting (stopped leg press, stopped deadlifts)  Chest and arm exercises  Ab work - weighted crunch, lumbar extension against weight,   Hip add and abduction  Knee extension machine   Hamstring curl    Orthopaedic history See Epic Chart   Notable medical history See Epic Chart   Patient Reported Health excellent       PHYSICAL THERAPY SPINE EXAMINATION    Dynamic Movement Screen:  2 leg stance: Decreased lumbar lordosis, equal weight bearing  2 leg squat:deferred, will perform next visit with PT    1 leg stance: deferred    1 leg squat: " deferred    Gait: Normal, non antalgic    Lumbar & Thoracic Spine ROM Screen   RIGHT LEFT   Standing Lumbar Spine ROM   Flexion Hands to knees, increased sx with increased reps   Extension Mild limitation, decreased sx with reps in standing and with prone   Sidebend Normal Normal   Seated Thoracic Spine ROM   Rotation Normal Normal     Sacroiliac Joint Provocative Testing: Deferred     Passive Joint Mobility Screen: Deferred    SUSPECTED DIRECTIONAL PREFERENCE: Extension     Lower Extremity Neural System Examination   Right Left   NEURAL CONDUCTION     Dermatome Screen (sensation) - -   Myotome Screen (motor) - -   NEURAL TENSION     Seated Slump Test - -   Supine SLR Test (Sciatic n.) - -   Prone KF (Femoral n.) - -     Lower Extremity Flexibility Screen:   Right Left   Hamstring ++ ++   KP - -   Goalie Stretch - -   Hip Flexor - -   ITB/Lat Hip in SL - -   Quadriceps + +   Gastroc/ Soleus ++ ++   - = normal  + = mild tightness  ++ = moderate tightness  +++ = significant tightness        ASSESSMENT/PLAN:  Patient is a 30 year old male with lumbar complaints.    Patient has the following significant findings with corresponding treatment plan.                Diagnosis 1:  Mechanical low back pain  Pain -  hot/cold therapy, manual therapy, STS, self management, education and directional preference exercise  Decreased ROM/flexibility - manual therapy, therapeutic exercise and home program  Decreased strength - therapeutic exercise, therapeutic activities and home program  Impaired muscle performance - neuro re-education and home program  Decreased function - therapeutic activities and home program  Impaired posture - neuro re-education and home program    Therapy Evaluation Codes:   1) History comprised of:   Personal factors that impact the plan of care:      None.    Comorbidity factors that impact the plan of care are:      None.     Medications impacting care: None.  2) Examination of Body Systems comprised  of:   Body structures and functions that impact the plan of care:      Lumbar spine.   Activity limitations that impact the plan of care are:      Bending, Dressing, Lifting, Sports, Squatting/kneeling, Standing and Working.  3) Clinical presentation characteristics are:   Stable/Uncomplicated.  4) Decision-Making    Low complexity using standardized patient assessment instrument and/or measureable assessment of functional outcome.  Cumulative Therapy Evaluation is: Low complexity.    Previous and current functional limitations:  (See Goal Flow Sheet for this information)    Short term and Long term goals: (See Goal Flow Sheet for this information)     Communication ability:  Patient appears to be able to clearly communicate and understand verbal and written communication and follow directions correctly.  Treatment Explanation - The following has been discussed with the patient:   RX ordered/plan of care  Anticipated outcomes  Possible risks and side effects  This patient would benefit from PT intervention to resume normal activities.   Rehab potential is good.    Frequency:  1 X week, once daily  Duration:  for 6 weeks  Discharge Plan:  Achieve all LTG.  Independent in home treatment program.  Reach maximal therapeutic benefit.    Please refer to the daily flowsheet for treatment today, total treatment time and time spent performing 1:1 timed codes.           Answers for HPI/ROS submitted by the patient on 2/20/2020   History Reported by Patient  Reason for Visit:: back pain  Where?: during recreation / sport  How problem occurred:: weightlifting  Number scale: 1/10  Pain quality: aching, cramping, sharp  Frequency: intermittent  Pain is: the same all the time  Progression since onset: gradually improving  Previous treatment: physical therapy  Improvement with previous treatment: significant  General health as reported by patient: good  Surgeries: orthopedic surgery  Medications you are currently taking:  other  Other Meds Detail: hydroxyzine  Medical allergies: none  Occupation:: grad student  What are your primary job tasks: computer work, driving, lifting/carrying, operating a machine/assembly, prolonged standing, pushing/pulling, repetitive tasks  Work restrictions: working in normal job without restrictions  Barriers at home/work: none as reported by patient  Red flags: none as reported by patient

## 2020-02-27 ENCOUNTER — THERAPY VISIT (OUTPATIENT)
Dept: PHYSICAL THERAPY | Facility: CLINIC | Age: 30
End: 2020-02-27
Payer: COMMERCIAL

## 2020-02-27 DIAGNOSIS — M54.50 CHRONIC BILATERAL LOW BACK PAIN WITHOUT SCIATICA: ICD-10-CM

## 2020-02-27 DIAGNOSIS — G89.29 CHRONIC BILATERAL LOW BACK PAIN WITHOUT SCIATICA: ICD-10-CM

## 2020-02-27 PROCEDURE — 97110 THERAPEUTIC EXERCISES: CPT | Mod: GP | Performed by: PHYSICAL THERAPY ASSISTANT

## 2020-02-27 PROCEDURE — 97112 NEUROMUSCULAR REEDUCATION: CPT | Mod: GP | Performed by: PHYSICAL THERAPY ASSISTANT

## 2020-03-05 ENCOUNTER — THERAPY VISIT (OUTPATIENT)
Dept: PHYSICAL THERAPY | Facility: CLINIC | Age: 30
End: 2020-03-05
Payer: COMMERCIAL

## 2020-03-05 DIAGNOSIS — G89.29 CHRONIC BILATERAL LOW BACK PAIN WITHOUT SCIATICA: ICD-10-CM

## 2020-03-05 DIAGNOSIS — M54.50 CHRONIC BILATERAL LOW BACK PAIN WITHOUT SCIATICA: ICD-10-CM

## 2020-03-05 PROCEDURE — 97110 THERAPEUTIC EXERCISES: CPT | Mod: GP | Performed by: PHYSICAL THERAPIST

## 2020-03-05 PROCEDURE — 97140 MANUAL THERAPY 1/> REGIONS: CPT | Mod: GP | Performed by: PHYSICAL THERAPIST

## 2020-03-05 PROCEDURE — 97112 NEUROMUSCULAR REEDUCATION: CPT | Mod: GP | Performed by: PHYSICAL THERAPIST

## 2020-03-11 ENCOUNTER — OFFICE VISIT (OUTPATIENT)
Dept: PSYCHOLOGY | Facility: CLINIC | Age: 30
End: 2020-03-11
Payer: COMMERCIAL

## 2020-03-11 DIAGNOSIS — F41.0 GENERALIZED ANXIETY DISORDER WITH PANIC ATTACKS: Primary | ICD-10-CM

## 2020-03-11 DIAGNOSIS — F41.1 GENERALIZED ANXIETY DISORDER WITH PANIC ATTACKS: Primary | ICD-10-CM

## 2020-03-11 NOTE — PATIENT INSTRUCTIONS
Thank you for coming in today.    Just a reminder that if you experience an increase in symptoms or feel you are in crisis, you can call the clinic number ([phone]).      If you are having an acute psychiatric emergency, please call 911 or have someone drive you directly to [facility] for further evaluation.  The homework/goals we discussed today are: Continue breathing and mindfulness exercises. Great idea to pair with stretching!  Please follow-up in 2-3 weeks.  Again thank you for choosing [facility] and please let us know how we can best partner with you to improve your and your family's health.

## 2020-03-11 NOTE — PROGRESS NOTES
"Behavioral Health Progress Note    Client Legal Name: Justin Morales   Client Preferred Name: Justin   Service Type: Individual  Length of Visit: 40 minutes  Attendees: patient     Identifying Information and Presenting Problem:    The patient is a 30 year old  male who is being seen for problematic symptoms of anxiety, panic attacks, and sleep difficulties.    Treatment Objective(s) Addressed in This Session:  Anxiety: will experience a reduction in anxiety, will develop more effective coping skills to manage anxiety symptoms, will develop healthy cognitive patterns and beliefs and will increase ability to function adaptively    Progress on / Status of Treatment Objective(s) / Homework:  Satisfactory progress     PHQ-9 SCORE 12/11/2019 2/10/2020   PHQ-9 Total Score 2 6       EVIE-7 SCORE 12/11/2019 2/10/2020   Total Score 9 4       Topics Discussed/Interventions Provided:    Justin reported that anxiety has been stable. No recent panic attacks. Improved sleep continues, although he has experienced 2-3 nights in the past few weeks where he wakes up with heart racing. Feels like he is better able to cognitively manage this (e.g., \"I no longer think I am dying, I know it will pass\") and has been able to relax himself 2/3 times with deep breathing. One time he did end up pacing for an hour until heart rate went down. Validated progress and use of strategies. Reviewed other calming strategies for relaxation (e.g., non-caffeineted tea, guided meditation).    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 Has not been in touch with advisor recently due to advisor's travel. Identified strategies to communicate needs and plan with advisor and plan to wrap up experiments to focus on writing.     Reviewed additional self-care and support. Continues exercising and eating healthy. Has cut " out caffeine. Takes dog out for walks. Spends time with friends and has asked for support from spouse.     Assessment: The patient appeared to be active and engaged in today's session and was receptive to feedback.      Mental Status: Justin appeared generally alert and oriented. Dress was casual and appropriate to the weather and occasion. Grooming and hygiene were good. Eye contact was good. Speech was of normal volume and rate and was clear, coherent, and relevant. Mood was okay with congruent, euthymic affect. Thought processes were relevant, logical and goal-directed. Thought content was WNL with no evidence of psychotic or paranoid features. No evidence of SI/HI or self-harm, intent, or plans. Memory appeared grossly intact. Insight and judgment appeared good and patient exhibited appropriate impulse control during the appointment.      Does the patient appear to be at imminent risk of harm to self/others at this time? No     The session was necessary to address problematic symptoms of anxiety and panic attacks that have been interfering with patient's ability to function in daily life.  Ongoing psychotherapy is necessary to improve functioning with daily activities, provide psychoeducation and provide support.     Diagnosis (DSM-5):  Generalized anxiety disorder with panic attacks     Plan:  1. Follow up in 2 weeks.  2. Justin will continue practicing mindfulness/relaxation exercises and work on dissertation next steps.   3. Continue physical therapy.  4. Follow-up with PCP as needed.     NOTE: Treatment plan update due 05/19/2020.  Diagnostic assessment update due 12/11/2020.     Stephie Ayala, PhD.  Behavioral Health Fellow

## 2020-03-19 ENCOUNTER — THERAPY VISIT (OUTPATIENT)
Dept: PHYSICAL THERAPY | Facility: CLINIC | Age: 30
End: 2020-03-19
Payer: COMMERCIAL

## 2020-03-19 ENCOUNTER — TELEPHONE (OUTPATIENT)
Dept: PHYSICAL THERAPY | Facility: CLINIC | Age: 30
End: 2020-03-19

## 2020-03-19 DIAGNOSIS — G89.29 CHRONIC BILATERAL LOW BACK PAIN WITHOUT SCIATICA: ICD-10-CM

## 2020-03-19 DIAGNOSIS — M54.50 CHRONIC BILATERAL LOW BACK PAIN WITHOUT SCIATICA: ICD-10-CM

## 2020-03-19 PROCEDURE — 98966 PH1 ASSMT&MGMT NQHP 5-10: CPT | Mod: GP | Performed by: PHYSICAL THERAPIST

## 2020-03-19 NOTE — TELEPHONE ENCOUNTER
"The patient has been notified of following:     \"This virtual visit will be conducted between you and your provider. We have found that certain health care needs can be provided without the need for physical presence.  This service lets us provide the care you need with a short virtual visit.  At this time, you will not be charged for this visit as we do not have the processes in place to bill for virtual encounters.\"    Due to external, as well as internal [facility] management of COVID-19 Virus, Justin Morales was not seen in our clinic.  As a substitution, we implemented a virtual visit to manage this patient's condition utilizing the [employer] virtual visit platform via the patient s existing code.  Telephone or Video Encounter: The [employer]Vax platform uses a synchronous HIPAA compliant video stream for this patient encounter.            S: Patient connected virtually on the [employer] platform to discuss their condition/progress. They noted improvements in their knee pain - it has been a \"solved issue\". Is noticing a continued dull ache at the low back (\"at S1\") that is present all the time. Will continue to have increased pain if he is bending forward to unload the . Stopped the unilateral press-ups and his pins and needles went away that he had been experiencing in his foot/ankle.    O: Patient demonstrated good understanding of the plan and new exercises. Verbalized understanding of change in plan.    Initiated Pelvic MET treatment (per video instruction) to assess and evaluate influence of pelvic mobility on his continued symptoms.     A:   Justin continues to have a consistent dull ache - I am suspicious of a pelvic involvement and instructed in new exercise to address (MET)    P: Patient will continue with the exercise program assigned on their PTRx code and will add the following measures to manage their pain/condition: Added pelvic tilts, pelvic MET, progressed strengthening > planks for added " strength.            Virtual visit contact time  Visit Started at 820  Visit Ended at 830  Total Time for documentation: 8 minutes    Procedure Code (For internal tracking only, please document any charges you would apply)  Therapeutic Exercise: 2 minutes  Therapeutic Activities: 8 minutes      I have reviewed the note as documented above.  This accurately captures the substance of my conversation with the patient.      Any subjective info about the quality of the visit, ease of use: Patient voiced that the platform was easy to use and smooth to operate  Patient's opinion about reasonable cost for this visit N/A

## 2020-03-20 NOTE — PROGRESS NOTES
"The patient has been notified of following:     \"This virtual visit will be conducted between you and your provider. We have found that certain health care needs can be provided without the need for physical presence.  This service lets us provide the care you need with a short virtual visit.  At this time, you will not be charged for this visit as we do not have the processes in place to bill for virtual encounters.\"    Due to external, as well as internal [facility] management of COVID-19 Virus, Justin Morales was not seen in our clinic.  As a substitution, we implemented a virtual visit to manage this patient's condition utilizing the [employer] virtual visit platform via the patient s existing code.  Telephone or Video Encounter: The [employer] platform uses a synchronous HIPAA compliant video stream for this patient encounter.            S: Patient connected virtually on the [employer] platform to discuss their condition/progress. They noted improvements in their knee pain - it has been a \"solved issue\". Is noticing a continued dull ache at the low back (\"at S1\") that is present all the time. Will continue to have increased pain if he is bending forward to unload the . Stopped the unilateral press-ups and his pins and needles went away that he had been experiencing in his foot/ankle.    O: Patient demonstrated good understanding of the plan and new exercises. Verbalized understanding of change in plan.    Initiated Pelvic MET treatment (per video instruction) to assess and evaluate influence of pelvic mobility on his continued symptoms.     A:   Justin continues to have a consistent dull ache - I am suspicious of a pelvic involvement and instructed in new exercise to address (MET)    P: Patient will continue with the exercise program assigned on their PTRx code and will add the following measures to manage their pain/condition: Added pelvic tilts, pelvic MET, progressed strengthening > planks for added " strength.            Virtual visit contact time  Visit Started at 820  Visit Ended at 830  Total Time for documentation: 8 minutes    Procedure Code (For internal tracking only, please document any charges you would apply)  Therapeutic Exercise: 2 minutes  Therapeutic Activities: 8 minutes      I have reviewed the note as documented above.  This accurately captures the substance of my conversation with the patient.      Any subjective info about the quality of the visit, ease of use: Patient voiced that the platform was easy to use and smooth to operate  Patient's opinion about reasonable cost for this visit N/A

## 2020-03-22 ENCOUNTER — HEALTH MAINTENANCE LETTER (OUTPATIENT)
Age: 30
End: 2020-03-22

## 2020-03-31 ENCOUNTER — TELEPHONE (OUTPATIENT)
Dept: PSYCHOLOGY | Facility: CLINIC | Age: 30
End: 2020-03-31

## 2020-04-01 ENCOUNTER — VIRTUAL VISIT (OUTPATIENT)
Dept: PSYCHOLOGY | Facility: CLINIC | Age: 30
End: 2020-04-01
Payer: COMMERCIAL

## 2020-04-01 DIAGNOSIS — F41.0 GENERALIZED ANXIETY DISORDER WITH PANIC ATTACKS: Primary | ICD-10-CM

## 2020-04-01 DIAGNOSIS — F41.1 GENERALIZED ANXIETY DISORDER WITH PANIC ATTACKS: Primary | ICD-10-CM

## 2020-04-01 NOTE — PATIENT INSTRUCTIONS
It was good talking to you today!   Just a reminder that if you experience an increase in symptoms or feel you are in crisis, you can call the clinic number ([phone]).      If you are having an acute psychiatric emergency, please call 911 or have someone drive you directly to [facility] for further evaluation.  The homework/goals we discussed today are: Continue with your work from home routine, manage stress through relaxation strategies, and good luck with your writing!  Please follow-up in 2 weeks - April 15, 2020 at 8:20 am  Again thank you for choosing [facility] and please let us know how we can best partner with you to improve your and your family's health.

## 2020-04-01 NOTE — PROGRESS NOTES
"Behavioral Health Progress Note    Client Legal Name: Justin Morales   Client Preferred Name: Justin   Service Type: Phone Visit  Length of Visit: 40 minutes (8:20 AM - 9:00 AM)  Attendees: patient     The following statement was read to the patient at the outset of this visit:    \"We have found that certain health care needs can be provided without the need for a face to face visit.  This service lets us provide the care you need with a phone conversation.   I will have full access to your [facility] medical record during this entire phone call.   I will be taking notes for your medical record.  Since this is like an office visit, we will bill your insurance company for this service. There are potential benefits and risks of telephone visits (e.g. limits to patient confidentiality) that differ from in-person visits.?  Confidentiality still applies for telephone services, and nobody will record the visit.  It is important to be in a quiet, private space that is free of distractions (including cell phone or other devices) during the visit.??If during the course of the call I believe a telephone visit is not appropriate, you will not be charged for this service.\"    Consent has been obtained for this service by care team member: Yes    Emergency contact: Manuel Martinez - [phone]  Closest Emergency Room: [facility]  Location at time of call: Home      Identifying Information and Presenting Problem:    The patient is a 30 year old  male who is being seen for problematic symptoms of anxiety, panic attacks, and sleep difficulties.    Treatment Objective(s) Addressed in This Session:  Anxiety: will experience a reduction in anxiety, will develop more effective coping skills to manage anxiety symptoms and will develop healthy cognitive patterns and beliefs  Adjustment and coping during pandemic    Progress on / Status of Treatment Objective(s) / Homework:  Satisfactory " progress     PHQ-9 SCORE 12/11/2019 2/10/2020   PHQ-9 Total Score 2 6       EVIE-7 SCORE 12/11/2019 2/10/2020   Total Score 9 4       Topics Discussed/Interventions Provided:  Justin reported improved mood and reduced anxiety. 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 No recent panic attacks. Still waking up with heart palpitations about 2 times a week, however, these are shorter and less scary than before. Used to panic when these happened which made symptoms worse, but is now relaxing and letting them pass. Will continue monitoring and Justin knows to discuss with Dr. Reyes if this worsens.     Justin noted that since he's working from home, not doing any lab experiments, or going to the university he has felt much better. His stress and anxiety is much lower. Said his partner said this is the happiest she has seen him in a year. Is able to lead a calmer pace - waking up, exercising, cooking, writing, taking dog for multiple walks, and enjoying reading for fun. Focusing on teaching and writing. Is noting that he's starting to enjoy his work again. Course was already online so he didn't need to make any changes. Identified that unsuccessful, repeated lab experiments and pressure were strongest contributor to anxiety and panic attacks. Did check in with advisor who said that there is no rush to graduate at the end of this semester which worried Justin since that had been his plan. Explored responses - accepting that many student's timelines are impacted by current outbreak, feels reassured by writing, identified strategies to discuss this with advisor or other professors to advocate for himself re: graduation timeline and additional funding if needed. Reinforced CBT/ACT strategies regarding identifying domains under his control, challenging catastrophic thinking, and cognitive defusion. Discussed focusing on helpful routine  "and writing for now, accepting uncertainty of the moment.     Assessment: The patient appeared to be active and engaged in today's session and was receptive to feedback.      Mental Status: Justin sounded generally alert and oriented. Speech was of normal volume and rate and was clear, coherent, and relevant. Mood was \"good\" with congruent, euthymic affect. Thought processes were relevant, logical and goal-directed. Thought content was WNL with no evidence of psychotic or paranoid features. No evidence of SI/HI or self-harm, intent, or plans. Memory appeared grossly intact. Insight and judgment appeared good and patient exhibited appropriate impulse control during the appointment.      Does the patient appear to be at imminent risk of harm to self/others at this time? No     The session was necessary to address problematic symptoms of anxiety and panic attacks that have been interfering with patient's ability to function in daily life.  Ongoing psychotherapy is necessary to improve functioning with daily activities, provide psychoeducation and provide support.     Diagnosis (DSM-5):  Generalized anxiety disorder with panic attacks     Plan:  1. Follow-up in 2 weeks - April 15, 2020 at 8:20 am  2. Continue work from home routine - healthy eating, sleeping, exercise/stretching, and relaxation.   3. Follow-up with PCP as needed.      NOTE: Treatment plan update due 05/19/2020.  Diagnostic assessment update due 12/11/2020.     Stephie Ayala, PhD.  Behavioral Health Fellow  "

## 2020-04-03 NOTE — PROGRESS NOTES
Preceptor Attestation:  I have reviewed and agree with the behavioral health fellow's documentation for this phone visit.  I did not see the patient.  Supervising Clinical Psychologist:  Edith Cortés PSYD LP

## 2020-04-14 ENCOUNTER — TELEPHONE (OUTPATIENT)
Dept: PSYCHOLOGY | Facility: CLINIC | Age: 30
End: 2020-04-14

## 2020-04-14 NOTE — TELEPHONE ENCOUNTER
This provider made an outreach call to patient to discuss steps for arranging a video visit before our appointment tomorrow 4/15/2020. Left a VM with reminder of appointment and information on how to set up for a video visit. Noted that at this time a computer/laptop seems to work best and I can send him an email with a link at the time of our visit.      Stephie Ayala, PhD.  Behavioral Health Fellow

## 2020-04-15 ENCOUNTER — VIRTUAL VISIT (OUTPATIENT)
Dept: PSYCHOLOGY | Facility: CLINIC | Age: 30
End: 2020-04-15
Payer: COMMERCIAL

## 2020-04-15 DIAGNOSIS — F41.0 GENERALIZED ANXIETY DISORDER WITH PANIC ATTACKS: Primary | ICD-10-CM

## 2020-04-15 DIAGNOSIS — F41.1 GENERALIZED ANXIETY DISORDER WITH PANIC ATTACKS: Primary | ICD-10-CM

## 2020-04-15 NOTE — PROGRESS NOTES
"Behavioral Health Progress Note    Client Legal Name: Justin Morales   Client Preferred Name: Justin   Service Type: Individual  Length of Visit: 35 minutes (8:25 AM - 9:00 AM)   Attendees: patient    Attempted telemedicine visit, however, invitation links did not work. Proceeded to phone visit.     The following statement was read to the patient at the outset of this visit:     \"We have found that certain health care needs can be provided without the need for a face to face visit.  This service lets us provide the care you need with a phone conversation.   I will have full access to your [facility] medical record during this entire phone call.   I will be taking notes for your medical record.  Since this is like an office visit, we will bill your insurance company for this service. There are potential benefits and risks of telephone visits (e.g. limits to patient confidentiality) that differ from in-person visits.?  Confidentiality still applies for telephone services, and nobody will record the visit.  It is important to be in a quiet, private space that is free of distractions (including cell phone or other devices) during the visit.??If during the course of the call I believe a telephone visit is not appropriate, you will not be charged for this service.\"     Consent has been obtained for this service by care team member: Yes     Emergency contact: Manuel Martinez - [phone]  Closest Emergency Room: [facility]  Location at time of call: Home      Identifying Information and Presenting Problem:    The patient is a 30 year old  male who is being seen for problematic symptoms of anxiety, panic attacks, and sleep difficulties.    Treatment Objective(s) Addressed in This Session:  Anxiety: will experience a reduction in anxiety, will develop more effective coping skills to manage anxiety symptoms and will develop healthy cognitive patterns and beliefs  Adjustment " and coping during pandemic    Progress on / Status of Treatment Objective(s) / Homework:  Satisfactory progress     PHQ-9 SCORE 12/11/2019 2/10/2020   PHQ-9 Total Score 2 6       EVIE-7 SCORE 12/11/2019 2/10/2020   Total Score 9 4       Topics Discussed/Interventions Provided:  Justin reported continued good mood and reduced anxiety. No recent panic attacks and is sleeping better. Attributed to being away from the lab and hopeless experiments. Enjoying working from home. 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 Noted he doesn't get the recharge from being with students in person. Normalized and provided empathic support.     Discussed progress towards graduation. Likely will no longer graduate in May as had been planned. Has not been able to receive direct answer from advisor. Did talk to another professor and his . They agreed his graduation timeline will likely change, maybe until the end of summer, but noted this was the case with many students due to COVID pandemic. He was reassured that he will graduate and there are no concerns about his work, this is just outside his control. Director noted that he will try to push for his graduation and will look into funding through the summer. Processed these news and emotional response. Praised Justin for advocating for himself. Validated feeling reassured yet uncertain.     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 Also sad they had to cancel honeymoon plans this summer - [place] and [place]. Provided empathic support. Reinforced coping strategies that are helping and identified priorities for the next few weeks.     Assessment: The patient appeared to be active and engaged in today's session and was receptive  "to feedback.      Mental Status: Justin sounded generally alert and oriented. Speech was of normal volume and rate and was clear, coherent, and relevant. Mood was \"good\" with congruent, euthymic affect. Thought processes were relevant, logical and goal-directed. Thought content was WNL with no evidence of psychotic or paranoid features. No evidence of SI/HI or self-harm, intent, or plans. Memory appeared grossly intact. Insight and judgment appeared good and patient exhibited appropriate impulse control during the appointment.      Does the patient appear to be at imminent risk of harm to self/others at this time? No    The session was necessary to address problematic symptoms of anxiety and panic attacks that have been interfering with patient's ability to function in daily life.  Ongoing psychotherapy is necessary to improve functioning with daily activities, provide psychoeducation and provide support.     Diagnosis (DSM-5):  Generalized anxiety disorder with panic attacks     Plan:  1. Follow-up in 3 weeks - May 6, 2020 at 8:20 am  2. Continue work from home routine - healthy eating, sleeping, exercise/stretching, and relaxation. Continue teaching and dissertation writing.  3. Follow-up with PCP as needed.      NOTE: Treatment plan update due 05/19/2020.  Diagnostic assessment update due 12/11/2020.     Stephie Ayala, PhD.  Behavioral Health Fellow    "

## 2020-04-15 NOTE — PROGRESS NOTES
Preceptor Attestation:  I have reviewed and agree with the behavioral health fellow's documentation for this video/phone visit.  I did not see the patient.  Supervising Clinical Psychologist:  Edith Cortés PSYD LP

## 2020-04-15 NOTE — PATIENT INSTRUCTIONS
It was good talking to you today! We scheduled a follow up on May 6, 2020 at 8:20 am. Hopefully this time the video will work and I will send you an email link or text invitation. Keep up with your work from home structure which is helping with your anxiety and good luck with your work!

## 2020-04-15 NOTE — Clinical Note
Hello, patient wants to schedule video follow up on May 6, 2020 at 8:20 am. I noticed the schedule template is not in Epic yet, but can someone put it in once it is scheduled? I will also keep track of when the schedule opens to log in appointment in system. Thanks! - Kim MUNOZ

## 2020-05-06 ENCOUNTER — VIRTUAL VISIT (OUTPATIENT)
Dept: PSYCHOLOGY | Facility: CLINIC | Age: 30
End: 2020-05-06
Payer: COMMERCIAL

## 2020-05-06 ENCOUNTER — VIRTUAL VISIT (OUTPATIENT)
Dept: PHYSICAL THERAPY | Facility: CLINIC | Age: 30
End: 2020-05-06
Payer: COMMERCIAL

## 2020-05-06 DIAGNOSIS — F41.1 GENERALIZED ANXIETY DISORDER WITH PANIC ATTACKS: Primary | ICD-10-CM

## 2020-05-06 DIAGNOSIS — F41.0 GENERALIZED ANXIETY DISORDER WITH PANIC ATTACKS: Primary | ICD-10-CM

## 2020-05-06 DIAGNOSIS — M54.50 CHRONIC BILATERAL LOW BACK PAIN WITHOUT SCIATICA: ICD-10-CM

## 2020-05-06 DIAGNOSIS — G89.29 CHRONIC BILATERAL LOW BACK PAIN WITHOUT SCIATICA: ICD-10-CM

## 2020-05-06 PROCEDURE — 97110 THERAPEUTIC EXERCISES: CPT | Mod: 95 | Performed by: PHYSICAL THERAPIST

## 2020-05-06 NOTE — PROGRESS NOTES
"Physical Therapy Virtual Follow Up Visit      The patient has been notified of following:     \"This virtual visit will be conducted between you and your provider. We have found that certain health care needs can be provided without the need for physical presence.  This service lets us provide the care you need with a virtual visit.\"    Due to external, as well as internal [facility] management of the COVID-19 Virus, Justin Morales was not seen in our clinic.  As a substitution, we implemented a virtual visit to manage this patient's condition utilizing the [employer] virtual visit platform via the patient s existing code.  The provider, Nya Mulligan, reviewed the patient's chart, PTRx prescription, and spoke with the patient to determine the following telemedicine visit is appropriate and effective for the patient's care.    The following type of visit was completed:   Video Visit:  The [employer] platform uses a synchronous HIPAA compliant video stream for this patient encounter.        S: Justin Morales is a 30 year old male. Connected virtually on the PTRx platform to discuss their condition/progress. They noted improvements in his hamstring length and his knee pain. It has gotten so much better! His knee pain is completely gone. His low back is getting better. There is still a constant dull ache but it is less intense and has had no spasms or sharp pains recently. The three most troubling activities are still 1) loading the , 2) raking, 3) lowering something to the floor. He would eventually like to get back to doing deadlifts and heavy squats.   Current pain level: 2/10    O: Patient demonstrated improved hamstring flexibility length. Improved core stability     PTRx Content from today's visit:  Exercise Name: Standing Extension - Sessions: Throughout the day  Exercise Name: Prone Press Ups, Sets: 3 - Reps: 10 - Sessions: 3  Exercise Name: Nerve Mobility Sciatic Position 5 - Reps: 20  Exercise Name: " "Doorway Hamstring Stretch - Reps: 3 x 30 seconds  Exercise Name: Posterior Pelvic Tilt - Reps: 15 - Sessions: 1, Notes: Do this one after you do the one above to move through the motion you gain.   Exercise Name: Bridging #1, Sets: 1 - Reps: 20-30 - Sessions: Every Other Day, Notes: Avoid lifting too high. Keep abdominals activated and tighten gluts. as you push up. 5 second hold  -Add band for resistance  Exercise Name: All 4s Arm Lift, Sets: 1-2 - Reps: 10 each arm - Sessions: Every Other Day, Notes: keep abdominals and LB in neutral position throughout ex.  MODIFICATION: Instead of the arm going towards the front, have the arm go out to the side.   Exercise Name: Side Plank Modified Knees - Sessions: 3-4x/week, Notes: MODIFICATION:   1) have your top arm out straight, pointing towards the ceiling,  2) bring that arm down, across your body and \"thread the needle\" going underneath your body and opposite arm  Repeat x 25 each side  Exercise Name: Prone Plank - Reps: Goal of 60 seconds - Sessions: 3-4x/week   Exercise Name: Shoulder Extension in Standing - Reps: Goal of 30 - Sessions: 3 x week, Notes: Have your back flat - almost in end range deadlift position.   Perform 1) shoulder extension and 2) bent over row  Start with just the weight of your arm.  When you can perform 30 repetitions without fatigue then move to holding a 6 oz can.  Continue the same progression continuing to a 12 oz can, 1 lb, with a max of 2 lb.  Exercise Name: Trunk Rotation With Theraband, Notes: If you have a band (the ball is not essential) or you can perform with weight. Start out to the side and above your head, slowly lower the weight across the body towards your opposite hip, bringing in a rotation.    A:   Patient is progressing as expected.  Response to therapy has shown an improvement in  pain level and flexibility    P: Patient will continue with the exercise program assigned on their PTRx code and will add the following measures " to manage their pain/condition: will follow-up PRN     Current treatment program is being advanced to more complex exercises.      Virtual visit contact time    Time of service began: 0400 PM  Time of service ended: 0412 PM  Total Time for set up, visit, and documentation: 12 minutes    Payor: RAJ / Plan: [employer] [place] / Product Type: Indemnity /   .  Procedure Code/s   Therapeutic Exercise (61272): 12 minutes    I have reviewed the note as documented above.  This accurately captures the substance of my conversation with the patient.  Provider location: [place], [place] (City/State)  Patient location: [place], [place]

## 2020-05-06 NOTE — PROGRESS NOTES
Behavioral Health Progress Note    Client Legal Name: Justin Morales   Client Preferred Name: Justin   Service Type: Individual  Length of Visit: 40 minutes  Attendees: patient    Telemedicine Visit: The patient's condition can be safely assessed and treated via synchronous audio and visual telemedicine encounter.      Reason for Telemedicine Visit: Covid-19 restrictions    Originating Site (Patient Location): Patient's home    Distant Site (Provider Location): Provider Remote Setting    Consent:  The patient/guardian has verbally consented to: the potential risks and benefits of telemedicine (video visit) versus in person care; bill my insurance or make self-payment for services provided; and responsibility for payment of non-covered services.     Mode of Communication:  Video Conference via [employer] after Flo did not work for patient.    As the provider I attest to compliance with applicable laws and regulations related to telemedicine.    Start time: 8:20 AM  Stop time: 9:00 AM    Emergency contact: Manuel Martinez - [phone]  Closest Emergency Room: [facility]  Location at time of call: Home        Identifying Information and Presenting Problem:     The patient is a 30 year old  male who is being seen for problematic symptoms of anxiety, panic attacks, and sleep difficulties.    Treatment Objective(s) Addressed in This Session:  Anxiety: will experience a reduction in anxiety, will develop more effective coping skills to manage anxiety symptoms and will increase ability to function adaptively  Adjustment and coping during Covid-19    Progress on / Status of Treatment Objective(s) / Homework:  Satisfactory progress     PHQ-9 SCORE 12/11/2019 2/10/2020   PHQ-9 Total Score 2 6       EVIE-7 SCORE 12/11/2019 2/10/2020   Total Score 9 4       Topics Discussed/Interventions Provided:  Justin reported he has been doing well. No panic attacks, reduced anxiety, and good mood.  "He has enjoyed working from home and spending time with partner and dog. Exercising and eating well. Does feel \"a little cooped up\" but not too bad.     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 This has been going well since he enjoys working with students. More clarity on graduation timeline - probably end of summer or early fall. Focusing on writing and finishing final document. Will have a graduate position during the summer which is reassuring. Has been communicating well with advisor and other faculty on his panel.     Provided empathic support and validation. Reinforced learned strategies that have improved overall well-being - routine setting, exercise, more open communication with department/advisor, thought diffusion, and mindfulness/relaxation. Discussed additional mindfulness exercises to bring variety to practice.     Assessment: The patient appeared to be active and engaged in today's session and was receptive to feedback.     Mental Status: Justin appeared generally alert and oriented. Dress was casual and appropriate to the weather and occasion. Grooming and hygiene were good. Eye contact was good. Speech was of normal volume and rate and was clear, coherent, and relevant. Mood was good with congruent, euthymic affect. Thought processes were relevant, logical and goal-directed. Thought content was WNL with no evidence of psychotic or paranoid features. No evidence of SI/HI or self-harm, intent, or plans. Memory appeared grossly intact. Insight and judgment appeared good and patient exhibited good impulse control during the appointment.     Does the patient appear to be at imminent risk of harm to self/others at this time? No     The session was necessary to address problematic symptoms of anxiety and panic attacks that have been interfering with patient's ability to function in daily life.  Ongoing psychotherapy is necessary to improve functioning with daily activities, provide psychoeducation and " provide support.     Diagnosis (DSM-5):  Generalized anxiety disorder with panic attacks       Plan:  1. Follow up in 1 month - 6/3/2020 at 8:20 am.  2. Work on goals as noted in patient instructions.  3. Utilize crisis resources as needed.      NOTE: Treatment plan update due 05/19/2020.  Diagnostic assessment update due 12/11/2020.     Stephie Ayala, PhD.  Behavioral Health Fellow

## 2020-05-06 NOTE — Clinical Note
Hi! Can someone schedule Justin for a video follow up with me on Tamara 3, 2020 at 8:20 am? Thanks! - Kim MUNOZ

## 2020-06-03 ENCOUNTER — VIRTUAL VISIT (OUTPATIENT)
Dept: PSYCHOLOGY | Facility: CLINIC | Age: 30
End: 2020-06-03
Payer: COMMERCIAL

## 2020-06-03 DIAGNOSIS — F41.1 GENERALIZED ANXIETY DISORDER WITH PANIC ATTACKS: Primary | ICD-10-CM

## 2020-06-03 DIAGNOSIS — F41.0 GENERALIZED ANXIETY DISORDER WITH PANIC ATTACKS: Primary | ICD-10-CM

## 2020-07-03 PROBLEM — M54.50 CHRONIC BILATERAL LOW BACK PAIN WITHOUT SCIATICA: Status: RESOLVED | Noted: 2020-02-20 | Resolved: 2020-07-03

## 2020-07-03 PROBLEM — G89.29 CHRONIC BILATERAL LOW BACK PAIN WITHOUT SCIATICA: Status: RESOLVED | Noted: 2020-02-20 | Resolved: 2020-07-03

## 2021-01-15 ENCOUNTER — HEALTH MAINTENANCE LETTER (OUTPATIENT)
Age: 31
End: 2021-01-15

## 2021-02-05 ENCOUNTER — OFFICE VISIT (OUTPATIENT)
Dept: FAMILY MEDICINE | Facility: CLINIC | Age: 31
End: 2021-02-05
Payer: COMMERCIAL

## 2021-02-05 VITALS
BODY MASS INDEX: 31.36 KG/M2 | TEMPERATURE: 98.2 F | HEART RATE: 70 BPM | OXYGEN SATURATION: 98 % | WEIGHT: 224 LBS | SYSTOLIC BLOOD PRESSURE: 125 MMHG | HEIGHT: 71 IN | DIASTOLIC BLOOD PRESSURE: 80 MMHG

## 2021-02-05 DIAGNOSIS — S39.012S STRAIN OF LUMBAR REGION, SEQUELA: ICD-10-CM

## 2021-02-05 DIAGNOSIS — F90.8 ATTENTION DEFICIT HYPERACTIVITY DISORDER (ADHD), OTHER TYPE: ICD-10-CM

## 2021-02-05 DIAGNOSIS — R00.2 PALPITATIONS: ICD-10-CM

## 2021-02-05 DIAGNOSIS — E66.811 CLASS 1 OBESITY WITHOUT SERIOUS COMORBIDITY WITH BODY MASS INDEX (BMI) OF 31.0 TO 31.9 IN ADULT, UNSPECIFIED OBESITY TYPE: ICD-10-CM

## 2021-02-05 DIAGNOSIS — Z00.00 ROUTINE GENERAL MEDICAL EXAMINATION AT A HEALTH CARE FACILITY: Primary | ICD-10-CM

## 2021-02-05 LAB
ANION GAP SERPL CALCULATED.3IONS-SCNC: 4 MMOL/L (ref 3–14)
BUN SERPL-MCNC: 13 MG/DL (ref 7–30)
CALCIUM SERPL-MCNC: 9 MG/DL (ref 8.5–10.1)
CHLORIDE SERPL-SCNC: 106 MMOL/L (ref 94–109)
CO2 SERPL-SCNC: 28 MMOL/L (ref 20–32)
CREAT SERPL-MCNC: 0.86 MG/DL (ref 0.66–1.25)
GFR SERPL CREATININE-BSD FRML MDRD: >90 ML/MIN/{1.73_M2}
GLUCOSE SERPL-MCNC: 91 MG/DL (ref 70–99)
HEMOGLOBIN: 14.9 G/DL (ref 13.3–17.7)
POTASSIUM SERPL-SCNC: 4.3 MMOL/L (ref 3.4–5.3)
SODIUM SERPL-SCNC: 139 MMOL/L (ref 133–144)
TSH SERPL DL<=0.005 MIU/L-ACNC: 1.7 MU/L (ref 0.4–4)

## 2021-02-05 PROCEDURE — 99395 PREV VISIT EST AGE 18-39: CPT | Mod: GC | Performed by: STUDENT IN AN ORGANIZED HEALTH CARE EDUCATION/TRAINING PROGRAM

## 2021-02-05 PROCEDURE — 80048 BASIC METABOLIC PNL TOTAL CA: CPT | Performed by: FAMILY MEDICINE

## 2021-02-05 PROCEDURE — 85018 HEMOGLOBIN: CPT | Performed by: STUDENT IN AN ORGANIZED HEALTH CARE EDUCATION/TRAINING PROGRAM

## 2021-02-05 PROCEDURE — 99213 OFFICE O/P EST LOW 20 MIN: CPT | Mod: 25 | Performed by: STUDENT IN AN ORGANIZED HEALTH CARE EDUCATION/TRAINING PROGRAM

## 2021-02-05 PROCEDURE — 84443 ASSAY THYROID STIM HORMONE: CPT | Performed by: FAMILY MEDICINE

## 2021-02-05 PROCEDURE — 36415 COLL VENOUS BLD VENIPUNCTURE: CPT | Performed by: FAMILY MEDICINE

## 2021-02-05 ASSESSMENT — MIFFLIN-ST. JEOR: SCORE: 2006.06

## 2021-02-05 NOTE — PATIENT INSTRUCTIONS
Preventive Health Recommendations  Male Ages 26 - 39    Yearly exam:             See your health care provider every year in order to  o   Review health changes.   o   Discuss preventive care.    o   Review your medicines if your doctor has prescribed any.    You should be tested each year for STDs (sexually transmitted diseases), if you re at risk.     After age 35, talk to your provider about cholesterol testing. If you are at risk for heart disease, have your cholesterol tested at least every 5 years.     If you are at risk for diabetes, you should have a diabetes test (fasting glucose).  Shots: Get a flu shot each year. Get a tetanus shot every 10 years.     Nutrition:    Eat at least 5 servings of fruits and vegetables daily.     Eat whole-grain bread, whole-wheat pasta and brown rice instead of white grains and rice.     Get adequate Calcium and Vitamin D.     Lifestyle    Exercise for at least 150 minutes a week (30 minutes a day, 5 days a week). This will help you control your weight and prevent disease.     Limit alcohol to one drink per day.     No smoking.     Wear sunscreen to prevent skin cancer.     See your dentist every six months for an exam and cleaning.     Patient Education   Here is the plan from today's visit    - NUTRITION REFERRAL - INTERNAL    Strain of lumbar region, sequela  - SOFI, PT, HAND AND CHIROPRACTIC REFERRAL - SOFI; Future    Palpitations  - TSH with free T4 reflex  - Basic Metabolic Panel ([facility])  - Hemoglobin (HGB) ([facility])  - Zio Patch Holter 48 Hour Adult Pediatric; Future    Scheduling:  If you have any concerns about today's visit or wish to schedule another appointment please call our office during normal business hours [phone] (8-5:00 M-F)  If a referral was made to a [facility] and you don't get a call from central scheduling please call [phone].  If a Mammogram was ordered for you at [facility] call [phone] to schedule  or change your appointment.  If you had an XRay/CT/Ultrasound/MRI ordered the number is [phone] to schedule or change your radiology appointment.   Medical Concerns:  If you have urgent medical concerns please call [phone] at any time of the day.    Mena Ashby MD

## 2021-02-05 NOTE — PROGRESS NOTES
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 He loves his job!    Back Pain      Duration: 3 years ago back injury, PT helped in the past, keeping up with exercises and only seeing maintenance not improvement         Specific cause: injury     Description:   Location of pain: low back bilateral  Character of pain: dull ache  Pain radiation:none  New numbness or weakness in legs, not attributed to pain:   YES down left leg a couple times when he first started physical therapy  Any new bowel or bladder incontinence?No     Intensity: Currently mild    History:   Pain interferes with job/home/school: No - worse after work as he spends a lot of time hunched over lab bench  History of back problems: recurrent self limited episodes of low back pain in the past  Any previous MRI or X-rays: None  Sees a specialist for back pain:  No  Therapies tried without relief: acetaminophen (Tylenol) and heat    Alleviating factors:   Improved by: Physical Therapy and stretch      Precipitating factors:  Worsened by: Bending      Accompanying Signs & Symptoms:  Risk of Fracture: No   Risk of Cauda Equina:No   Risk of Infection:  No   Risk of Cancer:  No     Weight concerns  - reports diet is good, bikes to work  - lost 15 lbs at start of pandemic, was walking a lot at that time (1-2 hour walk/daily)  - requesting to meet with nutritionist      Heart racing  - Makes beer  - if he has 3 alcoholic beverages in a day, 3-4 hours later, feels heart fluttering  - lasts 30-60 minutes  - has woken him up from sleep   - never happens if he hasn't drank alcohol   - 1 thermos of coffee per " day  - MI in older age  - paternal grandfather valve replacement in his 80s   - grandmother atrial fibrillation   - no early sudden cardiac death  - has had panic attacks in the past, seemed different, started with racing thoughts then heart rate increase 150s (heart rate monitor)  - no chest pain or shortness of breath       Head cyst  - small, first noticed 5-6 years ago, has gotten bigger  - occasional migraines with which the cyst seems to be painful      Patient Active Problem List   Diagnosis     ADHD (attention deficit hyperactivity disorder)     Generalized anxiety disorder with panic attacks       Past Medical History:   Diagnosis Date     ADHD      Anxiety         History reviewed. No pertinent family history.           Review of Systems:     Review of Systems:  CONSTITUTIONAL: NEGATIVE for fever, chills, change in weight  INTEGUMENTARY/SKIN: positive for head cyst as above  EYES: NEGATIVE for vision changes or irritation  ENT/MOUTH: NEGATIVE for ear, mouth and throat problems  RESP: NEGATIVE for significant cough or SOB  CV: POSITIVE for palpitations  GI: NEGATIVE for nausea, abdominal pain, heartburn, or change in bowel habits  : NEGATIVE for frequency, dysuria, or hematuria  MUSCULOSKELETAL:POSITIVE  for back pain   NEURO: NEGATIVE for weakness, dizziness or paresthesias  ENDOCRINE: NEGATIVE for temperature intolerance, skin/hair changes  HEME/ALLERGY: NEGATIVE for bleeding problems  PSYCHIATRIC: NEGATIVE for changes in mood or affect  Sleep:   Do you snore most or the night (as reported by a family member)? No  Do you feel sleepy or extremely tired during most of the day? No           Social History     Alcohol: occasional as above  Tobacco: none  IVDU: none    Marital Status:  Who lives in your household? Wife, 2 dogs     Has anyone hurt you physically, for example by pushing, hitting, slapping or kicking you or forcing you to have sex? Denies  Do you feel threatened or controlled by a  "partner, ex-partner or anyone in your life? Denies    Sexual Health     Sexual concerns: No   STI History: Neg      Recommended Screening     None.           Physical Exam:     Vitals: /80   Pulse 70   Temp 98.2  F (36.8  C) (Oral)   Ht 1.8 m (5' 10.87\")   Wt 101.6 kg (224 lb)   SpO2 98%   BMI 31.36 kg/m    BMI= Body mass index is 31.36 kg/m .  GENERAL: healthy, alert and no distress  EYES: Eyes grossly normal to inspection, extraocular movements - intact, and PERRL  HENT: ear canals- normal; TMs- normal; Nose- normal; Mouth- no ulcers, no lesions  NECK: no tenderness, no adenopathy, no asymmetry, no masses, no stiffness; thyroid- normal to palpation  RESP: lungs clear to auscultation - no rales, no rhonchi, no wheezes  CV: regular rate and rhythm, normal S1 S2, no S3 or S4 and no murmur, no click or rub -  ABDOMEN: soft, no tenderness, no  hepatosplenomegaly, no masses, normal bowel sounds  MS: extremities- no gross deformities noted, no edema  SKIN: no suspicious lesions, no rashes  NEURO: strength and tone- normal, sensory exam- grossly normal, mentation- intact, speech- normal, reflexes- symmetric  BACK: no CVA tenderness, no paralumbar tenderness  PSYCH: Alert and oriented times 3; speech- coherent , normal rate and volume; able to articulate logical thoughts, able to abstract reason, no tangential thoughts, no hallucinations or delusions, affect- normal  LYMPHATICS: ant. cervical- normal, post. cervical- normal, axillary- normal, supraclavicular- normal, inguinal- normal    Assessment and Plan      Justin was seen today for physical and referral.    Diagnoses and all orders for this visit:    Routine general medical examination at a health care facility    Class 1 obesity without serious comorbidity with body mass index (BMI) of 31.0 to 31.9 in adult, unspecified obesity type  Discussed realistic changes he can make to his current exercise and diet regimen. Limitations caused by COVID19 shut " downs certainly contributing. He is motivated for change. Consider referral to weight management clinic.   -     NUTRITION REFERRAL - INTERNAL    Strain of lumbar region, sequela, chronic worsening  Patient with chronic lower back pain which has worsened over the last few months in the setting of stopping physical therapy. Does have exercises at home which I encouraged him to do. No red flag symptoms to warrant imaging, would consider if not improving with physical therapy.   -     SOFI, PT, HAND AND CHIROPRACTIC REFERRAL - SOFI; Future    Palpitations  31 year old male with intermittent palpitations seemed to be triggered by alcohol intake. Association with alcohol intake and waking him from sleep somewhat concerning. Consider hyperthyroidism, electrolyte abnormalities, atrial fibrillation or other dysrhythmia, uncontrolled GERD given that episodes mostly happen when he is lying flat. No concerning findings on exam today, regular rhythm, no murmurs, normal blood pressure and HR. Recommend screening labs and Zio patch for further evaluation. No family history of sudden cardiac death, not associated with exertion. Consider further cardiac work up and empiric treatment of GERD to see if symptoms improve.   -     TSH with free T4 reflex  -     Basic Metabolic Panel ([facility])  -     Hemoglobin (HGB) ([facility])  -     Zio Patch Holter 48 Hour Adult Pediatric; Future    Options for treatment and follow-up care were reviewed with the patient. Justin Morales engaged in the decision making process and verbalized understanding of the options discussed and agreed with the final plan.    Mena Ashby MD

## 2021-02-08 ENCOUNTER — MYC MEDICAL ADVICE (OUTPATIENT)
Dept: FAMILY MEDICINE | Facility: CLINIC | Age: 31
End: 2021-02-08

## 2021-02-08 DIAGNOSIS — L72.11 PILAR CYST: Primary | ICD-10-CM

## 2021-02-12 ENCOUNTER — ANCILLARY PROCEDURE (OUTPATIENT)
Dept: CARDIOLOGY | Facility: CLINIC | Age: 31
End: 2021-02-12
Attending: FAMILY MEDICINE
Payer: COMMERCIAL

## 2021-02-12 ENCOUNTER — OFFICE VISIT (OUTPATIENT)
Dept: FAMILY MEDICINE | Facility: CLINIC | Age: 31
End: 2021-02-12
Payer: COMMERCIAL

## 2021-02-12 VITALS
TEMPERATURE: 98.6 F | BODY MASS INDEX: 31.5 KG/M2 | DIASTOLIC BLOOD PRESSURE: 70 MMHG | HEART RATE: 72 BPM | WEIGHT: 225 LBS | RESPIRATION RATE: 16 BRPM | OXYGEN SATURATION: 96 % | SYSTOLIC BLOOD PRESSURE: 114 MMHG

## 2021-02-12 DIAGNOSIS — L72.3 SEBACEOUS CYST: Primary | ICD-10-CM

## 2021-02-12 DIAGNOSIS — R00.2 PALPITATIONS: ICD-10-CM

## 2021-02-12 PROCEDURE — 11421 EXC H-F-NK-SP B9+MARG 0.6-1: CPT | Mod: GC | Performed by: FAMILY MEDICINE

## 2021-02-12 PROCEDURE — 93225 XTRNL ECG REC<48 HRS REC: CPT

## 2021-02-12 PROCEDURE — 93227 XTRNL ECG REC<48 HR R&I: CPT | Performed by: INTERNAL MEDICINE

## 2021-02-12 NOTE — PATIENT INSTRUCTIONS
Patient Education   Here is the plan from today's visit    1. Sebacous cyst     Please call or return to clinic if your symptoms don't go away.    Follow up plan: as needed  Thank you for coming to [facility] today.  Lab Testing:  **If you had lab testing today and your results are reassuring or normal they will be mailed to you or sent through [employer] within 7 days.   **If the lab tests need quick action we will call you with the results.  The phone number we will call with results is # [phone] (home) . If this is not the best number please call our clinic and change the number.  Medication Refills:  If you need any refills please call your pharmacy and they will contact us.   If you need to  your refill at a new pharmacy, please contact the new pharmacy directly. The new pharmacy will help you get your medications transferred faster.   Scheduling:  If you have any concerns about today's visit or wish to schedule another appointment please call our office during normal business hours [phone] (8-5:00 M-F)  If a referral was made to a [facility] and you don't get a call from central scheduling please call [phone].  If a Mammogram was ordered for you at [facility] call [phone] to schedule or change your appointment.  If you had an XRay/CT/Ultrasound/MRI ordered the number is [phone] to schedule or change your radiology appointment.   Medical Concerns:  If you have urgent medical concerns please call [phone] at any time of the day.    Cheko Monroe MD        Skin Biopsy  What is a skin biopsy?   A skin biopsy is the removal of a small piece of skin.     When is it used?   A skin biopsy will help your healthcare provider make a diagnosis of your problem. For example:   You may have an internal disease that a skin biopsy may explain.   You may have a skin disease or cancer.   Your skin may have become discolored.   Your skin may be inflamed.    Alternatives to this procedure include:   to proceed with treatment without a diagnosis   to choose not to have treatment, recognizing the risks of your condition   to take a watchful approach and reevaluate the lesion or disorder at a future time.   When should I call my healthcare provider?   Call your provider right away if:   You have bleeding that cannot be stopped by putting pressure on the wound.   Your wound becomes red or has pus or you develop a fever (signs of infection).   You have significant pain that is not controlled with ibuprofen or tylenol.     Wound Care  1. Keep it covered for the first 2-3 days with a band aid, or if it is a large wound or is draining a lot, a small piece of gauze with tape.  2. Apply a thin film of vaseline over the area to keep it mildly moist and prevent scab formation.   3. Change your bandaid daily.  4. As you heal, the new skin will be very sensitive to sun - please protect your healing wound by covering up and using sunscreen.   5. Results will be mailed or called to you. It can take up to 10 days to get biopsy results back. If you do not hear from us, please call us at [phone] and let us know that you haven't received your results.

## 2021-02-12 NOTE — PROGRESS NOTES
[facility]  Sebaceous Cyst Removal    Justin Morales is here for sebaceous cyst removal.   Indication: painful    Consent: Affirmation of informed consent was signed and scanned into the medical record. Risks, benefits and alternatives were discussed. Patient's questions were elicited and answered.   Procedure safety checklist was completed:  Yes  Time Out (Pause for the Cause) completed: Yes    Labs: none    Preoperative Diagnosis: Pilar cyst  Postoperative Diagnosis: Sebaceous cyst    Technique:   Skin prep Alcohol  Betadine  Anesthesia 1% lidocaine, with epi 1cc  Location:  Scalp, R temporal  Abscess size: 1cm  Incised abscess with 15 blade and purulent drainage expressed immediately. Capsule was removed.   Suture: 1 dissolvable suture was placed  EBL: minimal  Complications:  No  Tolerance:  Pt tolerated procedure well and was in stable condition.   Culture sent: No      Follow up: Pt was instructed to call if bleeding, severe pain or foul smell.     Resident: Reinaldo Starks DO, PGY2  Faculty: Cheko Monroe MD present for and supervised this entire procedure.

## 2021-02-12 NOTE — PROGRESS NOTES
Per Dr. Cheko Monroe, patient to have 2 day Zio Patch monitor placed.  Diagnosis: R00.2 Palpitation  Monitor placed: Yes  Patient Instructed: Yes  Patient verbalized understanding: Yes  Holter # [id number]  Placed by Lana Andino MA

## 2021-02-16 NOTE — PROGRESS NOTES
Preceptor Attestation:    Patient seen and evaluated in person. I was present for and supervised the entire procedure. I have verified the content of the note, which accurately reflects my assessment of the patient and the plan of care.   Supervising Physician:  Cheko Monroe MD.

## 2021-02-18 ENCOUNTER — OFFICE VISIT (OUTPATIENT)
Dept: FAMILY MEDICINE | Facility: CLINIC | Age: 31
End: 2021-02-18
Payer: COMMERCIAL

## 2021-02-18 VITALS
RESPIRATION RATE: 16 BRPM | WEIGHT: 227 LBS | BODY MASS INDEX: 31.78 KG/M2 | HEART RATE: 68 BPM | DIASTOLIC BLOOD PRESSURE: 74 MMHG | SYSTOLIC BLOOD PRESSURE: 115 MMHG | OXYGEN SATURATION: 98 %

## 2021-02-18 DIAGNOSIS — M62.838 MUSCLE SPASM OF LEFT LOWER EXTREMITY: Primary | ICD-10-CM

## 2021-02-18 PROCEDURE — 99213 OFFICE O/P EST LOW 20 MIN: CPT | Mod: 24 | Performed by: STUDENT IN AN ORGANIZED HEALTH CARE EDUCATION/TRAINING PROGRAM

## 2021-02-18 NOTE — PROGRESS NOTES
Preceptor Attestation:    Patient seen and evaluated in person. I discussed the patient with the resident. I have verified the content of the note, which accurately reflects my assessment of the patient and the plan of care.   Supervising Physician:  Bulmaro Packer MD.

## 2021-02-18 NOTE — PROGRESS NOTES
I was present with the medical student who participated in the service and in the documentation of this note. I have verified the history and personally performed the physical exam and medical decision making, and have verified the content of the note, which accurately reflects my assessment of the patient and the plan of care.   Dana Reyes MD           Assessment & Plan   Muscle spasm of left lower extremity   Most likely diagnosis at this time is spasm of the hamstring muscles since exam was only significant for palpable spasm of posterior thigh tendons and muscles without any pain or radiation. Low likelihood for acute tendon rupture, popliteal cyst, or meniscal/ligamental tear in knee since exam findings negative and no recent trauma to the knee. Also discussed electrolyte imbalances contributing to spasms- less likely since pt is not on any medications and does not have other generalized spasms/signs of lyte abnormality. Pt is able to bear weight and walk, limited by tension of hamstrings. Follows regularly with PT and acknowledges this is similar to his previous spasms and has been working on flexibility and range of motion for hamstrings and lower back.   - rest, elevate left leg  - appt with PT already scheduled- continue working on quadriceps strengthening   - Can try magnesium supplementation   - call medical provider if you develop any new pain, effusion, or warmth in the joint              Follows regularly with PT outpatient and will work on quadriceps strengthening and hamstring flexibility exercises.     Cecil Jones, MS4 Student     Dana Reyes MD  [facility] JOSEPH Anderson is a 31 year old who presents for muscle spasm in his left posterior thigh for 2 days. He said the symptom onset began two days ago with very mild stiffness in the AM. He was able to bike to work and work for a few hours before noticing increased stiffness in the back of his left leg. By 3  PM that day, he was not able to bike home and had to call his partner to pick him up from work. He says it is difficult for him to walk, but not due to pain or lack of strength. He says he feels too much resistance and stiffness at the back of his leg to straighten it completely, but is able to when he stretches out the affected muscle. This is similar to previous spasms he has had in the past. He does not report any pain, swelling, or recent trauma to the affected area. He came in for evaluation because he called the nurse line to check if he should be managing his spasm differently and they were concerned about him being unable to completely straighten his leg. No other concerns today.     Review of Systems   Constitutional, HEENT, cardiovascular, pulmonary, gi and gu systems are negative, except as otherwise noted.      Objective    /74   Pulse 68   Resp 16   Wt 103 kg (227 lb)   SpO2 98%   BMI 31.78 kg/m    Body mass index is 31.78 kg/m .  Physical Exam  Constitutional:       Appearance: Normal appearance. He is normal weight.   Musculoskeletal: Normal range of motion.      Right knee: Normal.      Left knee: He exhibits normal range of motion, no swelling, no effusion and normal patellar mobility. No tenderness found. No patellar tendon tenderness noted.      Right upper leg: Normal.      Left upper leg: He exhibits no tenderness, no bony tenderness, no swelling, no edema, no deformity and no laceration.      Right lower leg: He exhibits no tenderness, no swelling and no deformity. No edema.      Left lower leg: He exhibits no tenderness, no swelling and no deformity. No edema.   Skin:     General: Skin is warm and dry.   Neurological:      Mental Status: He is alert.   Psychiatric:         Mood and Affect: Mood normal.         Behavior: Behavior normal.        ----- Service Performed and Documented by Resident or Fellow ------

## 2021-03-04 ENCOUNTER — THERAPY VISIT (OUTPATIENT)
Dept: PHYSICAL THERAPY | Facility: CLINIC | Age: 31
End: 2021-03-04
Attending: FAMILY MEDICINE
Payer: COMMERCIAL

## 2021-03-04 DIAGNOSIS — S39.012S STRAIN OF LUMBAR REGION, SEQUELA: ICD-10-CM

## 2021-03-04 PROCEDURE — 97110 THERAPEUTIC EXERCISES: CPT | Mod: GP | Performed by: PHYSICAL THERAPIST

## 2021-03-04 PROCEDURE — 97161 PT EVAL LOW COMPLEX 20 MIN: CPT | Mod: GP | Performed by: PHYSICAL THERAPIST

## 2021-03-04 NOTE — PROGRESS NOTES
KEY PT FINDINGS:  1) Extension preference  2) Weak glut musculature    Physical Therapy Initial Evaluation: Subjective History     Injury/Condition Details:  Presenting Complaint Lower left back pain, plateaued for about a year   Onset Timing/Date Pain is the same since being seen last May 2020   Mechanism Gradual      Symptom Behavior Details    Primary Symptoms pain (Location: lower left low back, Quality: Aching/Throbbing), current 2-3/10   Symptom Provocators Lifting from the floor, sitting while driving long durations, dog pulls on leash, slipping on ice, running   Time of day dependent? No   Recent symptom change? no change in symptoms     Prior Testing/Intervention for current condition:  Prior Tests  none   Prior Treatment PT (last May 2020 for low back)     Lifestyle & General Medical History:  Employment [employer] - up/down and moving a lot   Usual physical activities  (within past year) [employer] general flexibility routines, bicycling - to/from work (6 miles/day), walking   Orthopaedic history See Epic Chart   Notable medical history See Epic Chart   Patient Reported Health good       PHYSICAL THERAPY SPINE EXAMINATION    Dynamic Movement Screen:  2 leg stance: equal weight bearing  2 leg squat:Normal, increase anterior pelvic tilt    1 leg stance:   Right: normal  Left: normal    Lumbar & Thoracic Spine ROM Screen   RIGHT LEFT   Standing Lumbar Spine ROM   Flexion Slightly passed knees, pain   Extension WNL     SUSPECTED DIRECTIONAL PREFERENCE: Extension    Lower Extremity Neural System Examination   Right Left   NEURAL TENSION     Supine SLR Test (Sciatic n.) - -   Prone KF (Femoral n.) - -     Lower Extremity Flexibility Screen:   Right Left   Hamstring +++ +++   Hip Flexor + +   Quadriceps ++ ++   - = normal  + = mild tightness  ++ = moderate tightness  +++ = significant tightness    Lower Extremity Muscle Strength (x/5)   Right Left   Hip ER 4/5* 4/5   Hip IR 4/5 4/5   Hip ABD 4+/5 3+/5    Hip Ext 4/5 4/5   Knee Flex 5/5 5/5   * pain in left side low back  ASSESSMENT/PLAN:      Patient is a 31 year old male with lumbar complaints.    Patient has the following significant findings with corresponding treatment plan.                Diagnosis 1:  Low back pain  Pain -  hot/cold therapy, US, electric stimulation, manual therapy, splint/taping/bracing/orthotics, self management, education and home program  Decreased ROM/flexibility - manual therapy, therapeutic exercise and home program  Decreased joint mobility - manual therapy, therapeutic exercise and home program  Decreased strength - therapeutic exercise, therapeutic activities and home program  Decreased function - therapeutic activities  Impaired posture - neuro re-education    Therapy Evaluation Codes:   1) History comprised of:   Personal factors that impact the plan of care:      None.    Comorbidity factors that impact the plan of care are:      None.     Medications impacting care: None.  2) Examination of Body Systems comprised of:   Body structures and functions that impact the plan of care:      Lumbar spine.   Activity limitations that impact the plan of care are:      Bending, Lifting and Working.  3) Clinical presentation characteristics are:   Stable/Uncomplicated.  4) Decision-Making    Low complexity using standardized patient assessment instrument and/or measureable assessment of functional outcome.  Cumulative Therapy Evaluation is: Low complexity.    Previous and current functional limitations:  (See Goal Flow Sheet for this information)    Short term and Long term goals: (See Goal Flow Sheet for this information)     Communication ability:  Patient appears to be able to clearly communicate and understand verbal and written communication and follow directions correctly.  Treatment Explanation - The following has been discussed with the patient:   RX ordered/plan of care  Anticipated outcomes  Possible risks and side effects  This  patient would benefit from PT intervention to resume normal activities.   Rehab potential is good.    Frequency:  2 X a month, once daily  Duration:  for 3 months  Discharge Plan:  Achieve all LTG.  Independent in home treatment program.  Reach maximal therapeutic benefit.    Please refer to the daily flowsheet for treatment today, total treatment time and time spent performing 1:1 timed codes.

## 2021-03-15 ENCOUNTER — OFFICE VISIT (OUTPATIENT)
Dept: CARDIOLOGY | Facility: CLINIC | Age: 31
End: 2021-03-15
Payer: COMMERCIAL

## 2021-03-15 VITALS
SYSTOLIC BLOOD PRESSURE: 124 MMHG | HEART RATE: 71 BPM | HEIGHT: 71 IN | WEIGHT: 225.9 LBS | DIASTOLIC BLOOD PRESSURE: 77 MMHG | BODY MASS INDEX: 31.63 KG/M2 | OXYGEN SATURATION: 99 %

## 2021-03-15 DIAGNOSIS — R00.2 PALPITATIONS: Primary | ICD-10-CM

## 2021-03-15 DIAGNOSIS — Z86.59 HISTORY OF PANIC ATTACKS: ICD-10-CM

## 2021-03-15 PROCEDURE — G0463 HOSPITAL OUTPT CLINIC VISIT: HCPCS

## 2021-03-15 PROCEDURE — 99204 OFFICE O/P NEW MOD 45 MIN: CPT | Performed by: INTERNAL MEDICINE

## 2021-03-15 ASSESSMENT — MIFFLIN-ST. JEOR: SCORE: 2011.81

## 2021-03-15 ASSESSMENT — PAIN SCALES - GENERAL: PAINLEVEL: NO PAIN (0)

## 2021-03-15 NOTE — NURSING NOTE
Chief Complaint   Patient presents with     Follow Up     Zio follow up appt     Vitals were taken and medications where reconciled.    Teo Antoine, EMT  12:39 PM

## 2021-03-15 NOTE — LETTER
3/15/2021      RE: Justin Morales  [address]  [facility] [id number]       Dear Colleague,    Thank you for the opportunity to participate in the care of your patient, Justin Morales, at the [facility] [place] [facility]. Please see a copy of my visit note below.    Referring provider: Self-referred    Chief complaint: Palpitations    HPI: Mr. Justin Morales is a 31 year old  male with PMH significant for generalized anxiety disorder.    Over the last 2 years patient noticed that if he drinks 3 or more alcoholic beverages he wakes up at night with palpitations and sweating.  He has noted his heart rate as high as 140 bpm.  Episode can last for 15 minutes up to few hours.  He has no documentation of these severe episodes so far.  He has seen his primary care physician and had a Zio patch done for 2 days (He drank 3 alcoholic beverages on each day while he was wearing Zio patch to bring on the palpitations.)  He tells me that he had mild symptoms but not severe ones when he was wearing the Zio patch.  I have personally reviewed the Zio patch which showed sinus rhythm with no cardiac arrhythmias.  Patient tells me that he has cut back on alcohol over the past 6 months.  He makes beer as a hobby.  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  He tells me that he drinks the beer that he makes.  He does not smoke.  No drug abuse.  He drinks few cups of coffee per day.  Rarely energy drinks.  Currently not on any medications.  Patient has no history of cardiac disease.  He patient's risk factor profile is: (-) HTN, (-) diabetes, (-) hyperlipidemia, (-) tobacco use, (-) family Hx CAD.      He has history of panic attack that he is able to control without medications.    The patient denies a history of chest discomfort, dyspnea, PND, orthopnea, pedal edema,  lightheadedness, or syncope.    Medications, personal, family, and social  "history reviewed with patient and revised.    PAST MEDICAL HISTORY:  Past Medical History:   Diagnosis Date     ADHD      Anxiety        CURRENT MEDICATIONS:  Current Outpatient Medications   Medication Sig Dispense Refill     Cyanocobalamin (B-12) 1000 MCG TBCR        fish oil-omega-3 fatty acids 1000 MG capsule Take 2 g by mouth daily       hydrOXYzine (ATARAX) 25 MG tablet Take 1 tablet (25 mg) by mouth every 4 hours as needed for itching 30 tablet 0     vitamin C (ASCORBIC ACID) 100 MG tablet Take 100 mg by mouth 3 times daily         PAST SURGICAL HISTORY:  No past surgical history on file.    ALLERGIES:   No Known Allergies    FAMILY HISTORY:  No family history on file.      SOCIAL HISTORY:  Social History     Tobacco Use     Smoking status: Never Smoker     Smokeless tobacco: Never Used   Substance Use Topics     Alcohol use: Yes     Comment: socially      Drug use: Never       ROS:   Answers for HPI/ROS submitted by the patient on 3/8/2021   General Symptoms: No  Skin Symptoms: No  HENT Symptoms: No  EYE SYMPTOMS: No  HEART SYMPTOMS: No  LUNG SYMPTOMS: No  INTESTINAL SYMPTOMS: No  URINARY SYMPTOMS: No  REPRODUCTIVE SYMPTOMS: No  SKELETAL SYMPTOMS: No  BLOOD SYMPTOMS: No  NERVOUS SYSTEM SYMPTOMS: No  MENTAL HEALTH SYMPTOMS: No        Exam:  /77 (BP Location: Right arm, Patient Position: Chair, Cuff Size: Adult Regular)   Pulse 71   Ht 1.803 m (5' 11\")   Wt 102.5 kg (225 lb 14.4 oz)   SpO2 99%   BMI 31.51 kg/m    GENERAL APPEARANCE: alert and no distress  HEENT: no icterus, no central cyanosis  LYMPH/NECK: no adenopathy, no asymmetry, JVP not elevated, no carotid bruits.  RESPIRATORY: lungs clear to auscultation - no rales, rhonchi or wheezes, no use of accessory muscles, no retractions, respirations are unlabored, normal respiratory rate  CARDIOVASCULAR: regular rhythm, normal S1, S2, no S3 or S4 and no murmur, click or rub, precordium quiet with normal PMI.  GI: soft, non tender  EXTREMITIES: " peripheral pulses normal, no edema  NEURO: alert, normal speech,and affect  VASC: Radial, dorsalis pedis and posterior tibialis pulses are normal in volumes and symmetric bilaterally.   SKIN: no ecchymoses, no rashes     I have reviewed the labs and personally reviewed the imaging below and made my comment in the assessment and plan.    Labs:  CBC RESULTS:   Lab Results   Component Value Date    HGB 14.9 02/05/2021       BMP RESULTS:  Lab Results   Component Value Date     02/05/2021    POTASSIUM 4.3 02/05/2021    CHLORIDE 106 02/05/2021    CO2 28 02/05/2021    ANIONGAP 4 02/05/2021    GLC 91 02/05/2021    BUN 13 02/05/2021    CR 0.86 02/05/2021    GFRESTIMATED >90 02/05/2021    GFRESTBLACK >90 02/05/2021    LILIAM 9.0 02/05/2021        Echocardiogram  No prior    EKG  No prior    Zio patch for 2 days 2/12/2021 personally reviewed shows sinus rhythm.  No preexcitation.  No cardiac arrhythmias.      Assessment and Plan:   Patient is being seen today for palpitations clearly related to alcoholic beverages.  He tells me that his threshold is 3 drink.  Beyond that he wakes up at night with palpitations sometimes very severe lasting up to few hours.  Over the last 6 months he has cut back on alcohol amount and reports resolution of palpitations. Recent Zio patch is unremarkable.  Physical exam is normal.  Labs show normal CBC, TSH and BMP.    I have discussed with the patient that some of the palpitation episodes that he has experienced after drinking more than 3 alcoholic beverages might be paroxysmal atrial fibrillation though no documentation.  I have discussed with the patient that there is clear data about alcohol induced atrial fibrillation.  He has apple watch but he has never recorded his rhythm at the time of these episodes.  I encouraged him to record his rhythm via his apple watch if he experiences another severe episode next time.  He is mildly obese.  He is trying to lose some weight.     #Alcohol induced  palpitations  -No documented arrhythmia so far  -Record rhythm with apple watch (he owns one) next time if it happens  -Maintain healthy body weight  -Avoid alcohol  -Return to clinic as needed    Total time spent today for this visit is 45 minutes including precharting, face-to-face clinic visit, review of labs/imaging and medical documentation.    Please donot hesitate to contact me if you have any questions or concerns. Again, thank you for allowing me to participate in the care of your patient.    Andres RUSHING MD  [facility] Division of Cardiology  Pager [phone]

## 2021-03-15 NOTE — PROGRESS NOTES
Referring provider: Self-referred    Chief complaint: Palpitations    HPI: Mr. Justin Morales is a 31 year old  male with PMH significant for generalized anxiety disorder.    Over the last 2 years patient noticed that if he drinks 3 or more alcoholic beverages he wakes up at night with palpitations and sweating.  He has noted his heart rate as high as 140 bpm.  Episode can last for 15 minutes up to few hours.  He has no documentation of these severe episodes so far.  He has seen his primary care physician and had a Zio patch done for 2 days (He drank 3 alcoholic beverages on each day while he was wearing Zio patch to bring on the palpitations.)  He tells me that he had mild symptoms but not severe ones when he was wearing the Zio patch.  I have personally reviewed the Zio patch which showed sinus rhythm with no cardiac arrhythmias.  Patient tells me that he has cut back on alcohol over the past 6 months.  He makes beer as a hobby.  He works at the [employer] department at [employer].  He tells me that he drinks the beer that he makes.  He does not smoke.  No drug abuse.  He drinks few cups of coffee per day.  Rarely energy drinks.  Currently not on any medications.  Patient has no history of cardiac disease.  He patient's risk factor profile is: (-) HTN, (-) diabetes, (-) hyperlipidemia, (-) tobacco use, (-) family Hx CAD.      He has history of panic attack that he is able to control without medications.    The patient denies a history of chest discomfort, dyspnea, PND, orthopnea, pedal edema,  lightheadedness, or syncope.    Medications, personal, family, and social history reviewed with patient and revised.    PAST MEDICAL HISTORY:  Past Medical History:   Diagnosis Date     ADHD      Anxiety        CURRENT MEDICATIONS:  Current Outpatient Medications   Medication Sig Dispense Refill     Cyanocobalamin (B-12) 1000 MCG TBCR        fish oil-omega-3 fatty acids 1000 MG capsule Take 2 g by mouth daily        "hydrOXYzine (ATARAX) 25 MG tablet Take 1 tablet (25 mg) by mouth every 4 hours as needed for itching 30 tablet 0     vitamin C (ASCORBIC ACID) 100 MG tablet Take 100 mg by mouth 3 times daily         PAST SURGICAL HISTORY:  No past surgical history on file.    ALLERGIES:   No Known Allergies    FAMILY HISTORY:  No family history on file.      SOCIAL HISTORY:  Social History     Tobacco Use     Smoking status: Never Smoker     Smokeless tobacco: Never Used   Substance Use Topics     Alcohol use: Yes     Comment: socially      Drug use: Never       ROS:   Answers for HPI/ROS submitted by the patient on 3/8/2021   General Symptoms: No  Skin Symptoms: No  HENT Symptoms: No  EYE SYMPTOMS: No  HEART SYMPTOMS: No  LUNG SYMPTOMS: No  INTESTINAL SYMPTOMS: No  URINARY SYMPTOMS: No  REPRODUCTIVE SYMPTOMS: No  SKELETAL SYMPTOMS: No  BLOOD SYMPTOMS: No  NERVOUS SYSTEM SYMPTOMS: No  MENTAL HEALTH SYMPTOMS: No        Exam:  /77 (BP Location: Right arm, Patient Position: Chair, Cuff Size: Adult Regular)   Pulse 71   Ht 1.803 m (5' 11\")   Wt 102.5 kg (225 lb 14.4 oz)   SpO2 99%   BMI 31.51 kg/m    GENERAL APPEARANCE: alert and no distress  HEENT: no icterus, no central cyanosis  LYMPH/NECK: no adenopathy, no asymmetry, JVP not elevated, no carotid bruits.  RESPIRATORY: lungs clear to auscultation - no rales, rhonchi or wheezes, no use of accessory muscles, no retractions, respirations are unlabored, normal respiratory rate  CARDIOVASCULAR: regular rhythm, normal S1, S2, no S3 or S4 and no murmur, click or rub, precordium quiet with normal PMI.  GI: soft, non tender  EXTREMITIES: peripheral pulses normal, no edema  NEURO: alert, normal speech,and affect  VASC: Radial, dorsalis pedis and posterior tibialis pulses are normal in volumes and symmetric bilaterally.   SKIN: no ecchymoses, no rashes     I have reviewed the labs and personally reviewed the imaging below and made my comment in the assessment and plan.    Labs:  CBC " RESULTS:   Lab Results   Component Value Date    HGB 14.9 02/05/2021       BMP RESULTS:  Lab Results   Component Value Date     02/05/2021    POTASSIUM 4.3 02/05/2021    CHLORIDE 106 02/05/2021    CO2 28 02/05/2021    ANIONGAP 4 02/05/2021    GLC 91 02/05/2021    BUN 13 02/05/2021    CR 0.86 02/05/2021    GFRESTIMATED >90 02/05/2021    GFRESTBLACK >90 02/05/2021    LILIAM 9.0 02/05/2021        Echocardiogram  No prior    EKG  No prior    Zio patch for 2 days 2/12/2021 personally reviewed shows sinus rhythm.  No preexcitation.  No cardiac arrhythmias.      Assessment and Plan:   Patient is being seen today for palpitations clearly related to alcoholic beverages.  He tells me that his threshold is 3 drink.  Beyond that he wakes up at night with palpitations sometimes very severe lasting up to few hours.  Over the last 6 months he has cut back on alcohol amount and reports resolution of palpitations. Recent Zio patch is unremarkable.  Physical exam is normal.  Labs show normal CBC, TSH and BMP.    I have discussed with the patient that some of the palpitation episodes that he has experienced after drinking more than 3 alcoholic beverages might be paroxysmal atrial fibrillation though no documentation.  I have discussed with the patient that there is clear data about alcohol induced atrial fibrillation.  He has apple watch but he has never recorded his rhythm at the time of these episodes.  I encouraged him to record his rhythm via his apple watch if he experiences another severe episode next time.  He is mildly obese.  He is trying to lose some weight.     #Alcohol induced palpitations  -No documented arrhythmia so far  -Record rhythm with apple watch (he owns one) next time if it happens  -Maintain healthy body weight  -Avoid alcohol  -Return to clinic as needed    Total time spent today for this visit is 45 minutes including precharting, face-to-face clinic visit, review of labs/imaging and medical  documentation.    Please donot hesitate to contact me if you have any questions or concerns. Again, thank you for allowing me to participate in the care of your patient.    Andres RUSHING MD  [facility] Division of Cardiology  Pager [phone]

## 2021-04-15 NOTE — PROGRESS NOTES
[facility] Physical Therapy Initial Evaluation  4/16/2021     Precautions/[place]/MD instructions: ***    Therapist Assessment: Justin Morales is a 31 year old male patient presenting to Physical Therapy with ***. Patient demonstrates ***. Signs and symptoms are consistent with ***. These impairments limit their ability to ***. Skilled PT services are necessary in order to reduce impairments and improve independent function.    SUBJECTIVE    Chief Complaint: ***  Associated symptoms: ***  Paresthesia (yes/no):  ***  Onset date: ***  Symptoms commenced as a result of: ***   Condition occurred in the following environment:   ***   Pain severity: ***/10 at rest; ***/10 current, ***/10 worst  Location of pain: ***  Quality of Pain: ***   Symptoms at onset (back/thigh/leg): ***  Constant symptoms (back/thigh/leg): ***  Intermittent symptoms (back/thigh/leg): ***  Better: ***  Worse:  ***  Time of day: ***  Progression of Symptoms since onset:  Since onset, these symptoms are {BETTER:771960706}  Previous treatment: has included ***; effect was ***  Current Functional Status: ***  Previous Functional Status:  fully functional prior to pain onset/injury.  Functional disability score (CHARLES/STarT Back):  ***  VAS score (0-10): ***      General health as reported by patient: {EXCELLENT. GOOD. FAIR, POOR:809894}.    PMH: ***   Surgical history/trauma: *** None. {He/She:833243} denies any significant current illness or recent hospital admissions. {He/She:564365} denies any regional implanted devices.  Imaging: ***  Medications: ***    Occupation: *** Job duties: ***  Current exercise regimen/Recreational activities: ***  Barriers to treatment: ***    Red Flags: (Bold when present) - reviewed the following and denies  Cauda equina: progressive motor or sensory loss, urinary retention or overflow incontinence, new fecal incontinence, saddle anesthesia, significant motor deficits encompassing multiple nerve  "roots  Fracture: Significant trauma, prolonged corticosteroid use, osteoporosis, age >70  Infection: spinal procedure in the last 12 months, IV drug use, immunosuppression, fever, wound in spinal region, generally unwell  Malignancy: history of metastatic cancer, unexplained weight loss      Patient's Goal(s): ***    OBJECTIVE    Posture:   Sitting (good/fair/poor): ***  Standing (good/fair/poor):***  Lordosis (red/acc/normal): ***  Correction of posture (better/worse/no effect): ***    Lateral Shift (right/left/nil): ***  Relevant (yes/no):  ***  Other Observations: ***    Neurological:    Motor deficit:  ***  Reflexes:  ***  Sensory deficit:  ***  Dural signs:  ***    Movement Loss:   Anil Mod Min Nil Pain   Flexion     ***   Extension     ***   Side Gliding R     ***   Side Gliding L     ***     Test Movements:   During: produces, abolishes, increases, decreases, no effect, centralizing, peripheralizing   After: better, worse, no better, no worse, no effect, centralized, peripheralized    Pretest symptoms standing: ***   Symptoms During Symptoms After ROM increased ROM decreased No Effect   FIS {SOFI MDT During Testin::\" \"} {SOFI MDT After Testin::\" \"}      Rep FIS {SOFI MDT During Testin::\" \"} {SOFI MDT After Testin::\" \"}      EIS {SOFI MDT During Testin::\" \"} {SOFI MDT After Testin::\" \"}      Rep EIS {SOFI MDT During Testin::\" \"} {SOFI MDT After Testin::\" \"}      Pretest symptoms lying: ***    Symptoms During Symptoms After ROM increased ROM decreased No Effect   EDMUNDO {SOFI MDT During Testin::\" \"} {SOFI MDT After Testin::\" \"}      Rep EDMUNDO {SOFI MDT During Testin::\" \"} {SOFI MDT After Testin::\" \"}      EIL {SOFI MDT During Testin::\" \"} {SOFI MDT After Testin::\" \"}      Rep EIL {SOFI MDT During Testin::\" \"} {SOFI MDT After Testin::\" \"}      If required, pretest symptoms: ***   Symptoms During Symptoms " "After ROM increased ROM decreased No Effect   SGIS - R {JOHN MDT During Testin::\" \"} {JOHN MDT After Testin::\" \"}      Rep SGIS - R {JOHN MDT During Testin::\" \"} {JOHN MDT After Testin::\" \"}      SGIS - L {JOHN MDT During Testin::\" \"} {JOHN MDT After Testin::\" \"}      Rep SGIS - L {JOHN MDT During Testin::\" \"} {JOHN MDT After Testin::\" \"}        Static Tests:  Sitting slouched:  ***  Sitting erect:  ***  Standing slouched ***  Standing erect:  ***  Lying prone in extension:  *** Long sitting:  ***    Other Tests: ***  SIJ Tests Positive (+)/Negative (-)/painful but not provocative of patient complaint (+/-)   Thigh thrust    Sacral thrust    Distraction    Compression    Gaenslen's      Hip screen: ***    Provisional Classification:  {john mdt lumbar classification:486780}    Principle of Management:  Education:  ***   Equipment provided:  ***  Mechanical therapy (Y/N):  ***   Extension principle:  ***  Lateral Principle:  ***  Flexion principle:  ***  Other:  ***    ASSESSMENT/PLAN  Patient is a 31 year old male with {AREA:447378} complaints.    Patient has the following significant findings with corresponding treatment plan.                Diagnosis 1:  ***    {REHAB LIMITATIONS/TREATMENT PLAN:638942}    Therapy Evaluation Codes:   1) History comprised of:   Personal factors that impact the plan of care:      {Personal factors impacting care:719422}.    Comorbidity factors that impact the plan of care are:      {Comorbidities impacting care:776583}.     Medications impacting care: {Medications Impacting care:180152}.  2) Examination of Body Systems comprised of:   Body structures and functions that impact the plan of care:      {Body Structures and functions:367733}.   Activity limitations that impact the plan of care are:      {Activity/participation limitations or restrictions:884102}.  3) Clinical presentation characteristics are:   {Clinical presentation " "characteristics:820864}.  4) Decision-Making    {Decision Making Low/Moderate/High:332730}  Cumulative Therapy Evaluation is: {Low/Moderate/High/Re-evaluation complexity:585332}.    Previous and current functional limitations:  (See Goal Flow Sheet for this information)    Short term and Long term goals: (See Goal Flow Sheet for this information)     Communication ability:  {COMMUNICATION ABILITY:975370::\"Patient appears to be able to clearly communicate and understand verbal and written communication and follow directions correctly.\"}  Treatment Explanation - The following has been discussed with the patient:   {RX EXPLANATION:899252::\"RX ordered/plan of care\",\"Anticipated outcomes\",\"Possible risks and side effects\"}  {RX PLANNED rehab:437522::\"This patient would benefit from PT intervention to resume normal activities.\"}   Rehab potential is {REHAB POTENTIAL/PROGNOSIS:031180}.    Frequency:  {FREQUENCY OF TREATMENT:153860}  Duration:  {DURATION OF TREATMENT:082236}  Discharge Plan:  {Discharge Plan:235999::\"Achieve all LTG.\",\"Independent in home treatment program.\",\"Reach maximal therapeutic benefit.\"}    Please refer to the daily flowsheet for treatment today, total treatment time and time spent performing 1:1 timed codes.     "

## 2021-04-16 ENCOUNTER — THERAPY VISIT (OUTPATIENT)
Dept: PHYSICAL THERAPY | Facility: CLINIC | Age: 31
End: 2021-04-16
Payer: COMMERCIAL

## 2021-04-16 DIAGNOSIS — G89.29 CHRONIC LEFT-SIDED LOW BACK PAIN WITHOUT SCIATICA: Primary | ICD-10-CM

## 2021-04-16 DIAGNOSIS — M54.50 CHRONIC LEFT-SIDED LOW BACK PAIN WITHOUT SCIATICA: Primary | ICD-10-CM

## 2021-04-16 PROCEDURE — 97112 NEUROMUSCULAR REEDUCATION: CPT | Mod: GP | Performed by: PHYSICAL THERAPIST

## 2021-04-16 PROCEDURE — 97110 THERAPEUTIC EXERCISES: CPT | Mod: GP | Performed by: PHYSICAL THERAPIST

## 2021-04-16 PROCEDURE — 97164 PT RE-EVAL EST PLAN CARE: CPT | Mod: GP | Performed by: PHYSICAL THERAPIST

## 2021-04-16 NOTE — PROGRESS NOTES
PROGRESS  REPORT    Progress reporting period is from 3/4/21 to 4/16/21.       SUBJECTIVE  Subjective changes noted by patient: Patient returns after a 6 week hiatus from PT. He reports that his back is improving. He has been doing stretching daily and every other day strengthening. He feels a low level consistent pain in the left low back. The more limiting factor is left medial HS/leg pain at this point. Also low level and constant but worsened with walking and stairs. Exercises are mostly still challenging and feel good, some questions.     Current pain level is 2/10.     Previous pain level was  4/10.   Changes in function:  Yes (See Goal flowsheet attached for changes in current functional level)  Adverse reaction to treatment or activity: None    OBJECTIVE  Changes noted in objective findings:  Yes, Lumbar AROM flexion hands to mid shin with end range stretch bilat HS and spine, extension very min loss; SG WNL bilat. HS length 80 R, 75 L no reproduction of pain. SIJ provocation tests negative. Decreased L quad and hip flexor flexibility, decreased mobility of L inominate. Reproduction of pain with medial HS strength testing and palpation over the distal medial HS.         ASSESSMENT/PLAN  Updated problem list and treatment plan: Diagnosis 1:  Diagnosis 1:  Low back pain  Pain -  hot/cold therapy, US, electric stimulation, manual therapy, splint/taping/bracing/orthotics, self management, education and home program  Decreased ROM/flexibility - manual therapy, therapeutic exercise and home program  Decreased joint mobility - manual therapy, therapeutic exercise and home program  Decreased strength - therapeutic exercise, therapeutic activities and home program  Decreased function - therapeutic activities  Impaired posture - neuro re-education    STG/LTGs have been met or progress has been made towards goals:  Yes (See Goal flow sheet completed today.)  Assessment of Progress: The patient's condition is  improving.  The patient's condition has potential to improve.  Self Management Plans:  Patient has been instructed in a home treatment program.  I have re-evaluated this patient and find that the nature, scope, duration and intensity of the therapy is appropriate for the medical condition of the patient.  Justin continues to require the following intervention to meet STG and LTG's:  PT    Recommendations:  This patient would benefit from continued therapy.     Frequency:  1 X a month, once daily  Duration:  for 4 visits        Please refer to the daily flowsheet for treatment today, total treatment time and time spent performing 1:1 timed codes.

## 2021-05-11 ENCOUNTER — OFFICE VISIT (OUTPATIENT)
Dept: FAMILY MEDICINE | Facility: CLINIC | Age: 31
End: 2021-05-11
Payer: COMMERCIAL

## 2021-05-11 VITALS
HEART RATE: 73 BPM | WEIGHT: 226.6 LBS | SYSTOLIC BLOOD PRESSURE: 118 MMHG | OXYGEN SATURATION: 96 % | BODY MASS INDEX: 31.6 KG/M2 | DIASTOLIC BLOOD PRESSURE: 74 MMHG | TEMPERATURE: 98.9 F

## 2021-05-11 DIAGNOSIS — M79.644 PAIN OF RIGHT THUMB: Primary | ICD-10-CM

## 2021-05-11 PROCEDURE — 99213 OFFICE O/P EST LOW 20 MIN: CPT | Mod: GC | Performed by: STUDENT IN AN ORGANIZED HEALTH CARE EDUCATION/TRAINING PROGRAM

## 2021-05-11 NOTE — PROGRESS NOTES
Assessment & Plan     Pain of right thumb  Slowly improving pain after hyper-extension injury about 10 days ago while golfing. Not swollen or red. Will get X-ray to make sure no fracture, and then will likely send to hand therapy if X-ray within normal limits. Will mychart with results and plan.  - XR Hand Right G/E 3 Views; Future       FUTURE APPOINTMENTS:       - Follow-up visit if worsening or not improving    No follow-ups on file.    Gerhard Alcazar MD  [facility] JOSEPH Anderson is a 31 year old who presents for the following health issues     HPI     Bent the right thumb backwards playing golf, about 9-10 days ago. Wasn't painful right away, but the next day it was pretty much unusable. Using brace from [employer] and ibuprofen. Slowly slightly getting better, but still quite painful.        Objective    /74 (BP Location: Left arm, Patient Position: Sitting, Cuff Size: Adult Large)   Pulse 73   Temp 98.9  F (37.2  C) (Oral)   Wt 102.8 kg (226 lb 9.6 oz)   SpO2 96%   BMI 31.60 kg/m    Body mass index is 31.6 kg/m .  Physical Exam  Constitutional:       General: He is not in acute distress.     Appearance: He is well-developed.   HENT:      Head: Normocephalic and atraumatic.   Eyes:      General: No scleral icterus.     Conjunctiva/sclera: Conjunctivae normal.   Cardiovascular:      Rate and Rhythm: Normal rate.   Pulmonary:      Effort: Pulmonary effort is normal. No respiratory distress.   Musculoskeletal:         General: Tenderness (ttp over right thumb at base of mcp bilaterally) present. No swelling.      Comments: ROM normal but limited by pain   Neurological:      Mental Status: He is alert and oriented to person, place, and time.   Psychiatric:         Mood and Affect: Mood normal.

## 2021-05-11 NOTE — PROGRESS NOTES
Preceptor Attestation:   Patient seen, evaluated and discussed with the resident. I have verified the content of the note, which accurately reflects my assessment of the patient and the plan of care.   Supervising Physician:  Sammi Blanco MD

## 2021-05-13 ENCOUNTER — ANCILLARY PROCEDURE (OUTPATIENT)
Dept: GENERAL RADIOLOGY | Facility: CLINIC | Age: 31
End: 2021-05-13
Attending: FAMILY MEDICINE
Payer: COMMERCIAL

## 2021-05-13 DIAGNOSIS — M79.644 PAIN OF RIGHT THUMB: ICD-10-CM

## 2021-05-13 PROCEDURE — 73130 X-RAY EXAM OF HAND: CPT | Mod: RT | Performed by: RADIOLOGY

## 2021-05-25 ENCOUNTER — THERAPY VISIT (OUTPATIENT)
Dept: OCCUPATIONAL THERAPY | Facility: CLINIC | Age: 31
End: 2021-05-25
Attending: FAMILY MEDICINE
Payer: COMMERCIAL

## 2021-05-25 DIAGNOSIS — M79.644 PAIN OF RIGHT THUMB: ICD-10-CM

## 2021-05-25 PROCEDURE — 97140 MANUAL THERAPY 1/> REGIONS: CPT | Mod: GO | Performed by: OCCUPATIONAL THERAPIST

## 2021-05-25 PROCEDURE — 97110 THERAPEUTIC EXERCISES: CPT | Mod: GO | Performed by: OCCUPATIONAL THERAPIST

## 2021-05-25 PROCEDURE — 97165 OT EVAL LOW COMPLEX 30 MIN: CPT | Mod: GO | Performed by: OCCUPATIONAL THERAPIST

## 2021-05-25 NOTE — PROGRESS NOTES
[facility] Hand Therapy Initial Evaluation     Current Date: 5/25/2021    Diagnosis: R thumb pain   DOI: 5/1/21  Procedure: none  Post: 3w 3d    Subjective:  The history is provided by the patient.   Patient Health History  Justin Morales being seen for Injured thumb.     Problem began: 5/1/2021.   Problem occurred: Bent thumb backwards over top of palm   Pain is reported as 5/10 on pain scale.  General health as reported by patient is good.  Pertinent medical history includes: none.   Red flags:  None as reported by patient.  Medical allergies: none.   Surgeries include:  None.    Current medications:  None.    Current occupation is .   Primary job tasks include:  Computer work, driving, lifting/carrying, operating a machine/assembly, prolonged sitting, prolonged standing, pushing/pulling and repetitive tasks.                Occupational Profile Information:  Right hand dominant  Prior functional level:  no limitations  Patient reports symptoms of pain, stiffness/loss of motion and weakness/loss of strength  Special tests:  x-ray.    Previous treatment: OTC orthosis, ice, ibuprofen  Barriers include:none  Mobility: No difficulty  Transportation: drives and bicycles  Currently working in normal job without restrictions  Leisure activities/hobbies: golf, baking bread    Functional Outcome Measure:   Upper Extremity Functional Index Score:  SCORE:   Column Totals: /80: (P) 41   (A lower score indicates greater disability.)    Objective:  Pain Level (Scale 0-10)   5/25/2021   At Rest 1/10   With Use 10/10     Pain Description  Date 5/25/2021   Location R thumb, lateral and medial   Pain Quality Sharp, Shooting and bruised, dull, ache   Frequency intermittent     Pain is worst  daytime   Exacerbated by  gripping, pinching   Relieved by cold, otc medications and rest   Progression improving     Edema (Circumference measured in cm)   5/25/2021 5/25/2021   Thumb L R   P1 6.6 6.8   IP 6.5 6.8     Sensation    WNL throughout all nerve distributions; per patient report    ROM  Thumb 5/25/2021 5/25/2021   AROM  (PROM) L R   MP 0/52 -14/47   IP 0/60 0/60   RABD 64 47   PABD 56 46     Strength   (Measured in pounds)  Pain Report: - none  + mild    ++ moderate    +++ severe    5/25/2021 5/25/2021   Trials L R   1  2  3 89  96  97 65  87  89   Average 94 80     Lat Pinch 5/25/2021 5/25/2021   Trials L R   1  2  3 26  29  27 3++   Average 27 3     3 Pt Pinch 5/25/2021 5/25/2021   Trials L R   1  2  3 21  21  22 5++   Average 21 5     Resistance 5/25/2021   APL +   APB +   Thumb adductor +   EPB -   FPB +   FPL -     Assessment:  Patient presents with symptoms consistent with diagnosis of right thumb pain,  with conservative intervention.     Patient's limitations or Problem List includes:  Pain, Decreased ROM/motion, Increased edema and Weakness of the right thumb which interferes with the patient's ability to perform Self Care Tasks (dressing, eating, bathing, hygiene/toileting), Work Tasks, Sleep Patterns, Recreational Activities, Household Chores and Driving  as compared to previous level of function.    Rehab Potential:  Excellent - Return to full activity, no limitations    Patient will benefit from skilled Occupational Therapy to increase ROM, overall strength and stability of thumb and decrease pain and edema to return to previous activity level and resume normal daily tasks and to reach their rehab potential.    Barriers to Learning:  No barrier    Communication Issues:  Patient appears to be able to clearly communicate and understand verbal and written communication and follow directions correctly.    Chart Review: Chart Review and Simple history review with patient    Identified Performance Deficits: bathing/showering, toileting, dressing, feeding, hygiene and grooming, work and leisure activities    Assessment of Occupational Performance:  5 or more Performance Deficits    Clinical Decision Making (Complexity):  Low complexity    Treatment Explanation:  The following has been discussed with the patient:  RX ordered/plan of care  Anticipated outcomes  Possible risks and side effects    Plan:  Frequency:  1 X week, once daily  Duration:  for 4 weeks tapering to 2 X a month over 8 weeks    Treatment Plan:   Modalities:  US, Fluidotherapy and Paraffin  Therapeutic Exercise:  AROM, AAROM, PROM, Tendon Gliding, Blocking, Isotonics, Isometrics and Stabilization  Neuromuscular re-education:  Nerve Gliding and Kinesiotaping  Manual Techniques:  Joint mobilization, Friction massage and Myofascial release  Orthotic Fabrication:  Static orthosis  Self Care:  Self Care Tasks    Discharge Plan:  Achieve all LTG.  Independent in home treatment program.  Reach maximal therapeutic benefit.    Home Exercise Program:  Massage to thenars and adductor  Thumb AROM  icing    Next Visit:  A/HELEN/PROM  MFR

## 2021-06-03 ENCOUNTER — THERAPY VISIT (OUTPATIENT)
Dept: OCCUPATIONAL THERAPY | Facility: CLINIC | Age: 31
End: 2021-06-03
Payer: COMMERCIAL

## 2021-06-03 DIAGNOSIS — M79.644 PAIN OF RIGHT THUMB: ICD-10-CM

## 2021-06-03 PROCEDURE — 97110 THERAPEUTIC EXERCISES: CPT | Mod: GO | Performed by: OCCUPATIONAL THERAPIST

## 2021-06-03 PROCEDURE — 97140 MANUAL THERAPY 1/> REGIONS: CPT | Mod: GO | Performed by: OCCUPATIONAL THERAPIST

## 2021-06-03 NOTE — PROGRESS NOTES
SOAP note objective information for 6/3/2021.    Diagnosis: R thumb pain   DOI: 5/1/21  Procedure: none  Post: 4w 5d    ROM  Thumb 5/25/2021 5/25/2021 6/3/21   AROM  (PROM) L R    MP 0/52 -14/47 -6/34   IP 0/60 0/60 0/74   RABD 64 47 51   PABD 56 46 47   Please refer to the daily flowsheet for treatment today, total treatment time and time spent performing 1:1 timed codes.         Home Exercise Program:  Massage to thenars and adductor  Thumb AROM  Gentle  strengthening  icing    Next Visit:  A/AA/PROM  MFR  Pinch strengthening

## 2021-06-22 ENCOUNTER — THERAPY VISIT (OUTPATIENT)
Dept: OCCUPATIONAL THERAPY | Facility: CLINIC | Age: 31
End: 2021-06-22
Payer: COMMERCIAL

## 2021-06-22 DIAGNOSIS — M79.644 PAIN OF RIGHT THUMB: ICD-10-CM

## 2021-06-22 PROCEDURE — 97110 THERAPEUTIC EXERCISES: CPT | Mod: GO | Performed by: OCCUPATIONAL THERAPIST

## 2021-06-22 PROCEDURE — 97140 MANUAL THERAPY 1/> REGIONS: CPT | Mod: GO | Performed by: OCCUPATIONAL THERAPIST

## 2021-06-22 NOTE — PROGRESS NOTES
"Hand Therapy Progress Note    Current Date:  6/22/2021    Diagnosis: R thumb pain   DOI: 5/1/21  Procedure: none  Post: 7w 3d    Reporting period is 5/25/21 to 6/22/2021    Subjective:   Subjective changes noted by patient:  \"It's been feeling better!  I still get pain when I'm opening containers.\"  Functional changes noted by patient:  Improvement in Self Care Tasks (dressing, eating, bathing, hygiene/toileting), Work Tasks, Sleep Patterns, Recreational Activities, Household Chores and Driving   Patient has noted adverse reaction to:  None    Functional Outcome Measure:  Upper Extremity Functional Index Score:  SCORE:   Column Totals: /80: 68   (A lower score indicates greater disability.)    Objective:  Pain Level (Scale 0-10)   5/25/2021 6/22/21   At Rest 1/10 0/10   With Use 10/10 5/10     Pain Description  Date 5/25/2021 6/22/21   Location R thumb, lateral and medial R thumb; later MP joint   Pain Quality Sharp, Shooting and bruised, dull, ache Bruised, dull, achey   Frequency intermittent   intermittent   Pain is worst  daytime day   Exacerbated by  gripping, pinching Opening containers   Relieved by cold, otc medications and rest ice   Progression improving improving     Edema (Circumference measured in cm)   5/25/2021 5/25/2021 6/22/21   Thumb L R    P1 6.6 6.8 6.5   IP 6.5 6.8 6.4     ROM  Thumb 5/25/2021 5/25/2021 6/22/21   AROM  (PROM) L R    MP 0/52 -14/47 0/50   IP 0/60 0/60 0/85   RABD 64 47 61   PABD 56 46 50     Strength   (Measured in pounds)  Pain Report: - none  + mild    ++ moderate    +++ severe    5/25/2021 5/25/2021 6/22/21   Trials L R    1  2  3 89  96  97 65  87  89 89  91  97   Average 94 80 92     Lat Pinch 5/25/2021 5/25/2021 6/22/21   Trials L R    1  2  3 26  29  27 3++ 7  10  12   Average 27 3 10     3 Pt Pinch 5/25/2021 5/25/2021 6/22/21   Trials L R    1  2  3 21  21  22 5++ 6  8  11   Average 21 5 8   Please refer to the daily flowsheet for treatment provided today. "     Assessment:  Response to therapy has been improvement to:  ROM of Thumb:  All Planes  Strength:   and pinch  Edema:  less brawny edema  Pain:  frequency is less, intensity of pain is decreased, duration of pain is decreased and less tender over affected area    Overall Assessment:  Patient's symptoms are resolving.  Patient is progressing well and is ready to decrease frequency of treatment in the clinic.  STG/LTG:  STGoals have been reviewed and progress or achievement has occurred;  see goal sheet for details and updates.    Plan:  Frequency/Duration:  Recommend continuing to see patient  1x/more, then discontinue to I HEP.  Appropriateness of Rx I have re-evaluated this patient and find that the nature, scope, duration and intensity of the therapy is appropriate for the medical condition of the patient.  Recommendations for Continued Therapy  1x/more    Home Exercise Program:  Massage to thenars and adductor  Thumb AROM  Gentle  strengthening  icing    Next Visit:  discontinue to I HEP.

## 2021-07-13 ENCOUNTER — THERAPY VISIT (OUTPATIENT)
Dept: OCCUPATIONAL THERAPY | Facility: CLINIC | Age: 31
End: 2021-07-13
Payer: COMMERCIAL

## 2021-07-13 DIAGNOSIS — M79.644 PAIN OF RIGHT THUMB: ICD-10-CM

## 2021-07-13 PROCEDURE — 97140 MANUAL THERAPY 1/> REGIONS: CPT | Mod: GO | Performed by: OCCUPATIONAL THERAPIST

## 2021-07-13 PROCEDURE — 97110 THERAPEUTIC EXERCISES: CPT | Mod: GO | Performed by: OCCUPATIONAL THERAPIST

## 2021-07-13 NOTE — PROGRESS NOTES
"Hand Therapy Discharge Note    Current Date:  7/13/2021    Diagnosis: R thumb pain   DOI: 5/1/21  Procedure: none  Post: >2 months  Reporting period is 6/22/21 to 7/13/2021    Subjective:   Subjective changes noted by patient:  \"It's doing well!  I did have one time where I quickly caught something and it hurt, but it didn't last more than a minute.\"  Functional changes noted by patient:  Improvement in Self Care Tasks (dressing, eating, bathing, hygiene/toileting), Work Tasks, Sleep Patterns, Recreational Activities, Household Chores and Driving   Patient has noted adverse reaction to:  None    Functional Outcome Measure:  Upper Extremity Functional Index Score:  SCORE:   Column Totals: /80: 76   (A lower score indicates greater disability.)    Objective:  Pain Level (Scale 0-10)   5/25/2021 6/22/21 7/13/21   At Rest 1/10 0/10 0/10   With Use 10/10 5/10 7/10     Pain Description  Date 5/25/2021 6/22/21 7/13/21   Location R thumb, lateral and medial R thumb; later MP joint R thumb, MP joint   Pain Quality Sharp, Shooting and bruised, dull, ache Bruised, dull, achey achey   Frequency intermittent   intermittent intermittent   Pain is worst  daytime day day   Exacerbated by  gripping, pinching Opening containers Opening containers   Relieved by cold, otc medications and rest ice Ice, rest   Progression improving improving improving     Edema (Circumference measured in cm)   5/25/2021 5/25/2021 6/22/21 7/13/21   Thumb L R     P1 6.6 6.8 6.5 6.4   IP 6.5 6.8 6.4 6.4     ROM  Thumb 5/25/2021 5/25/2021 6/22/21 7/13/21   AROM  (PROM) L R     MP 0/52 -14/47 0/50 0/56   IP 0/60 0/60 0/85 0/85   RABD 64 47 61 63   PABD 56 46 50 55     Strength   (Measured in pounds)  Pain Report: - none  + mild    ++ moderate    +++ severe    5/25/2021 5/25/2021 6/22/21 7/13/21   Trials L R     1  2  3 89  96  97 65  87  89 89  91  97 99  97  92   Average 94 80 92 96     Lat Pinch 5/25/2021 5/25/2021 6/22/21 7/13/21   Trials L R   "   1  2  3 26  29  27 3++ 7  10  12 25  27  27   Average 27 3 10 26     3 Pt Pinch 5/25/2021 5/25/2021 6/22/21 7/13/21   Trials L R     1  2  3 21 21 22 5++ 6  8  11 17  19  17   Average 21 5 8 18     Please refer to the daily flowsheet for treatment provided today.     Assessment:  Response to therapy has been improvement to:  ROM of Thumb:  All Planes  Strength:   and pinch  Edema:  less brawny edema  Pain:  frequency is less, intensity of pain is decreased and duration of pain is decreased    Overall Assessment:  Patient's symptoms are resolving.  STG/LTG:  STGoals have been reviewed and progress or achievement has occurred;  see goal sheet for details and updates.    Plan:  Frequency/Duration:  discontinue to I HEP.  Appropriateness of Rx I have re-evaluated this patient and find that the nature, scope, duration and intensity of the therapy is appropriate for the medical condition of the patient.  Recommendations for Continued Therapy  discontinue to I HEP.    Home Exercise Program:  Massage to thenars and adductor  Thumb AROM  Gentle  strengthening  icing

## 2021-09-05 ENCOUNTER — HEALTH MAINTENANCE LETTER (OUTPATIENT)
Age: 31
End: 2021-09-05

## 2021-11-09 PROBLEM — M54.50 CHRONIC LOW BACK PAIN: Status: RESOLVED | Noted: 2020-02-20 | Resolved: 2021-11-09

## 2021-11-09 PROBLEM — G89.29 CHRONIC LOW BACK PAIN: Status: RESOLVED | Noted: 2020-02-20 | Resolved: 2021-11-09

## 2021-12-14 ENCOUNTER — OFFICE VISIT (OUTPATIENT)
Dept: FAMILY MEDICINE | Facility: CLINIC | Age: 31
End: 2021-12-14
Payer: COMMERCIAL

## 2021-12-14 VITALS
WEIGHT: 232.4 LBS | BODY MASS INDEX: 31.48 KG/M2 | SYSTOLIC BLOOD PRESSURE: 136 MMHG | DIASTOLIC BLOOD PRESSURE: 79 MMHG | HEART RATE: 80 BPM | TEMPERATURE: 98.1 F | RESPIRATION RATE: 16 BRPM | OXYGEN SATURATION: 99 % | HEIGHT: 72 IN

## 2021-12-14 DIAGNOSIS — F41.9 ANXIETY: ICD-10-CM

## 2021-12-14 DIAGNOSIS — F41.1 GENERALIZED ANXIETY DISORDER WITH PANIC ATTACKS: ICD-10-CM

## 2021-12-14 DIAGNOSIS — Z23 ENCOUNTER FOR IMMUNIZATION: Primary | ICD-10-CM

## 2021-12-14 DIAGNOSIS — F41.0 GENERALIZED ANXIETY DISORDER WITH PANIC ATTACKS: ICD-10-CM

## 2021-12-14 PROCEDURE — 99214 OFFICE O/P EST MOD 30 MIN: CPT | Mod: 25 | Performed by: STUDENT IN AN ORGANIZED HEALTH CARE EDUCATION/TRAINING PROGRAM

## 2021-12-14 PROCEDURE — 90471 IMMUNIZATION ADMIN: CPT | Performed by: STUDENT IN AN ORGANIZED HEALTH CARE EDUCATION/TRAINING PROGRAM

## 2021-12-14 PROCEDURE — 90715 TDAP VACCINE 7 YRS/> IM: CPT | Performed by: STUDENT IN AN ORGANIZED HEALTH CARE EDUCATION/TRAINING PROGRAM

## 2021-12-14 RX ORDER — ALPRAZOLAM 0.5 MG
0.5 TABLET ORAL 3 TIMES DAILY PRN
Qty: 4 TABLET | Refills: 0 | Status: SHIPPED | OUTPATIENT
Start: 2021-12-14 | End: 2023-03-07

## 2021-12-14 RX ORDER — ALPRAZOLAM 1 MG
1 TABLET ORAL 3 TIMES DAILY PRN
Qty: 10 TABLET | Refills: 0 | Status: CANCELLED | OUTPATIENT
Start: 2021-12-14

## 2021-12-14 ASSESSMENT — MIFFLIN-ST. JEOR: SCORE: 2051.67

## 2021-12-14 NOTE — PATIENT INSTRUCTIONS
Patient Education   Here is the plan from today's visit    1. Encounter for immunization  - TDAP VACCINE (Adacel, Boostrix)  [[id number]]    2. Generalized anxiety disorder with panic attacks  4 tabs Xanax, two for flight there and 2 for the way back.  - ALPRAZolam (XANAX) 0.5 MG tablet; Take 1 tablet (0.5 mg) by mouth 3 times daily as needed for anxiety  Dispense: 4 tablet; Refill: 0    4. BMI 31.0-31.9,adult  Let us know if we can help with this. Otherwise, return for your physical!  - Nutrition Referral; Future  - Lipid panel; Future      Please call or return to clinic if your symptoms don't go away.    Follow up plan  No follow-ups on file.    Thank you for coming to [facility] today.  Lab Testing:  **If you had lab testing today and your results are reassuring or normal they will be mailed to you or sent through [employer] within 7 days.   **If the lab tests need quick action we will call you with the results.  **If you are having labs done on a different day, please call [phone] to schedule at [facility] or [phone] for other [facility] locations. Labs do not offer walk-in appointments.  The phone number we will call with results is # [phone] (home) . If this is not the best number please call our clinic and change the number.  Medication Refills:  If you need any refills please call your pharmacy and they will contact us.   If you need to  your refill at a new pharmacy, please contact the new pharmacy directly. The new pharmacy will help you get your medications transferred faster.   Scheduling:  If you have any concerns about today's visit or wish to schedule another appointment please call our office during normal business hours [phone] (8-5:00 M-F)  If a referral was made to an [facility] specialty provider and you do not get a call from central scheduling, please refer to directions on your visit summary or call our office during normal business hours  for assistance.   If a Mammogram was ordered for you at the [facility] Center call [phone] to schedule or change your appointment.  If you had an XRay/CT/Ultrasound/MRI ordered the number is [phone] to schedule or change your radiology appointment.   [facility] has limited ultrasound appointments available on Wednesdays, if you would like your ultrasound at [facility], please call [phone] to schedule.   Medical Concerns:  If you have urgent medical concerns please call [phone] at any time of the day.    Yarely Jj, DO

## 2021-12-14 NOTE — PROGRESS NOTES
ASSESSMENT & PLAN    Justin was seen today for referral and refill request.    Diagnoses and all orders for this visit:        Generalized anxiety disorder with panic attacks  Flight associated panic attacks, last flight 2 years ago.  Hydroxyzine did not provide sufficient relief.  Patient requesting something else to try on upcoming flight to [facility] this Saturday.  Provided 4 tablets of 0.5 mg Xanax; 2 for flight to [place] and 2 for flight returning home.  Education provided on not mixing this medication with alcohol, other prescription or nonprescription medications, or any other recreational drugs or substances.  Patient affirms understanding.  -     ALPRAZolam (XANAX) 0.5 MG tablet; Take 1 tablet (0.5 mg) by mouth 3 times daily as needed for anxiety        BMI 31.0-31.9,adult  Patient concerned that weight is impacting the pain in his knees and lower back, education provided on how losing weight can improve back pain significantly.  Nutritionist referral requested and provided today.  Reviewed healthy diet such as DASH diet, and the Mediterranean diet.  Patient participates in healthy exercise, feels trouble with weight loss is more diet related.  No past lipid panel on file, patient will return when fasting and make a lab appointment today.  -     Nutrition Referral; Future  -     Lipid panel; Future    Encounter for immunization  -     TDAP VACCINE (Adacel, Boostrix)  [[id number]]  Follow up in 3 months for routine physical.     Yarely Jj,   Pronouns: she/her/hers  Resident Physician  [facility]    Department of Family Medicine and Community Health     [facility]    -----  Chief Complaint   Patient presents with     Referral     nutritionist      Refill Request     hydroxyzine        SUBJECTIVE  Justin Morales is a/an 31 year old male who is seen for evaluation of panic on flights and weight loss.     Going to [facility] on Saturday on  "[place]. Has severe anxiety with plane/flights. Was prescribed hydroxyzine and it helps. Heart rate shoots up and white knuckles arm rests. Doesn't feel it was helpful enough, would like to try something different.     Worried about extra weight affecting his knees. Healthy diet, active lifestyle. Bikes to work every day, regardless of weather. Uses a food scale. Tracks calories. Would like to see a nutritionist for extra help.     Back/Knee Pain  Onset: 4 years(s) ago. Patient describes injury as deadlifting, pop in back  Location of Pain: bilateral knees, middle of lower back  Better with: PT, helped a lot. Chiropractor, helping.  Not interested in further testing or PT referral at this time.      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    No family history pertinent to patient's problem today.     REVIEW OF SYSTEMS:  Review of Systems  Constitutional, HEENT, cardiovascular, pulmonary, gi and gu systems are negative, except as otherwise noted.    OBJECTIVE:  /79   Pulse 80   Temp 98.1  F (36.7  C) (Oral)   Resp 16   Ht 1.82 m (5' 11.65\")   Wt 105.4 kg (232 lb 6.4 oz)   SpO2 99%   BMI 31.82 kg/m     Constitutional: No acute distress, sitting comfortably  Eyes: EOMI, no eye discharge, no icterus, injection or swelling.  Ears, nose, mouth, throat: Normocephalic, no nasal drainage, speaks clearly.   Neck: Normal range of motion  CV: Extremeties well perfused  Respiratory: No audible wheezing, strido, cough, or other respiratory distress. Speaking in full sentences.  GI: Nondistended abdomen  MSK: Normal digits, moving extremeties well, symmetric strength visible throughout.  Skin: No visible rashes, bruising or other skin lesions.   Neuro: AOx3  Psych: Cooperative, mood, thought and judgement appropriate to situation.            "

## 2021-12-16 ENCOUNTER — LAB (OUTPATIENT)
Dept: LAB | Facility: CLINIC | Age: 31
End: 2021-12-16
Payer: COMMERCIAL

## 2021-12-16 PROCEDURE — 36415 COLL VENOUS BLD VENIPUNCTURE: CPT

## 2021-12-16 PROCEDURE — 80061 LIPID PANEL: CPT

## 2021-12-17 LAB
CHOLEST SERPL-MCNC: 176 MG/DL
FASTING STATUS PATIENT QL REPORTED: NORMAL
HDLC SERPL-MCNC: 48 MG/DL
LDLC SERPL CALC-MCNC: 99 MG/DL
NONHDLC SERPL-MCNC: 128 MG/DL
TRIGL SERPL-MCNC: 144 MG/DL

## 2022-01-07 ENCOUNTER — VIRTUAL VISIT (OUTPATIENT)
Dept: ONCOLOGY | Facility: CLINIC | Age: 32
End: 2022-01-07
Attending: FAMILY MEDICINE
Payer: COMMERCIAL

## 2022-01-07 PROCEDURE — 97802 MEDICAL NUTRITION INDIV IN: CPT | Mod: GT,95 | Performed by: DIETITIAN, REGISTERED

## 2022-01-07 NOTE — PROGRESS NOTES
Video-Visit Details     Type of service:  Video Visit     Video Start Time (time video started): 8:01am     Video End Time (time video stopped): 8:33am    Originating Location (pt. Location): Home     Distant Location (provider location):  [facility]      Mode of Communication:  Video Conference via [facility]    CLINICAL NUTRITION SERVICES - ASSESSMENT NOTE    Justin Morales 31 year old referred for MNT related to overweight/ weight management     Time Spent: 32 minutes  Visit Type: video  Referring Physician: Sammi Blacno 12/14/21  Z68.31 (ICD-10-CM) - BMI 31.0-31.9,adult  Pt accompanied by: self    NUTRITION HISTORY  Current diet: Vegetarian    Justin presents today with desire to obtain guidance for weight management and weight loss.   -He has a history of successful weight loss with activity and diet 'extreme fat smash diet'.    -He engaged in routine meal planning including mostly lean proteins and non-starch vegetables such as chicken eggs, broccoli, nuts, salad kits, 'a very paired down diet'.    -When he met his wife he switched to a vegetarian diet and slowly gained back some weight.    -He had been tracking his calories and weighing his foods from Feb to July of 2021, aiming for 1746-4815 calories/day.  -With this he was not successful with weight loss.   -His focus was ~2200 calories/day but would often add calories, 400-600 with activity.   -Justin does all the cooking in his house and all meal prep for the week.  His wife follows a Vegan diet.    -He avoid most calorie containing beverages other than alcohol beverages, ~1/day or more on the weekends, socically. He understands that alcohol calories can add up quickly and would like to cut back.   -His breakfasts vary and tend to be 'less healthy' as he often grabs and goes, getting to work as early as 7:30. He often feels hungry in the morning and has cravings after a light, non-sustainable breakfast.  "      Diet Recall  Breakfast Vary - raisin bran OR luna azul rice cereal OR bagel and tends to feel hungry during the day   Lunch 1 cup tofu, 1 cup rice, 1 cup broccoli with olive oil   Dinner Welsh Foh OR spring mix greenn sald with peppers, cukes, poppy seed dressing OR vegan cheese, crackers, nuts  After dinner likes some type of sweet or etoh bevg   Snacks Tries not to snack or has pnb or cheese crackers or nuts or popcorn   Beverages 16 oz coffee, 48 oz water, 1 etoh beverage     ANTHROPOMETRICS  Height: 5'11\"  Weight: 232 lb/105kg  BMI: 31  Weight Status:  Obesity Grade I BMI 30-34.9  Weight History:   Wt Readings from Last 8 Encounters:   12/14/21 105.4 kg (232 lb 6.4 oz)   05/11/21 102.8 kg (226 lb 9.6 oz)   03/15/21 102.5 kg (225 lb 14.4 oz)   02/18/21 103 kg (227 lb)   02/12/21 102.1 kg (225 lb)   02/05/21 101.6 kg (224 lb)   02/10/20 104.5 kg (230 lb 6.4 oz)   01/23/20 104 kg (229 lb 3.2 oz)     Dosing Weight: 85kg      Medications/vitamins/minerals/herbals:   Reviewed    Labs:  Labs reviewed    ASSESSED NUTRITION NEEDS:  Estimated Energy Needs: 9664-8084 kcals (20-25 Kcal/Kg)  Justification: overweight  Estimated Protein Needs: 85 grams protein (1 g pro/Kg)  Justification: maintenance    NUTRITION DIAGNOSIS:  Obesity related to self-monitoring deficit, excessive caloric intake AEB diet recall BMI >35    INTERVENTIONS  Provided written & verbal education:   --Discussed dietary and behavioral modifications to promote weight loss (fiber sources, protein sources, exercise and avoidance of disordered eating patterns, skipping meals).    --Reviewed nutrition needs for desired wt loss. Encouraged to aim for 5888-2676 calories, 85g protein and 25g fiber/day. Reviewed fiber and protein intake.  Encouraged to have fiber and protein source with each meal, especially breakfast.  Encouraged to keep calories at baseline of 2898-9581/day even with exercise.    --Justin is interested in metabolic testing.  RD " offered RMR testing in 2-3 weeks.       Provided pt with corresponding education materials/handouts on:  Batch cooked recipes - sent via email    Pt verbalize understanding of materials provided during consult.   Patient Understanding: Excellent  Expected Compliance: Excellent    Goals  1.  Aim 1700-2000kcal /day   2.  Continue tracking daily intake on MFP or soledad/website of choice  3. Weight loss (0.5-1.0 lb /week desirable)      Follow-Up Plans: Pt has RD contact information for questions.  Scheduled for 2-3 weeks for RMR testing.    MONITORING AND EVALUATION:  -Food intake  -Weight trends    Ese Hernandez, BETHN, LDN

## 2022-01-07 NOTE — LETTER
1/7/2022         RE: Justin Morales  [address]  [place] [address]        Dear Colleague,    Thank you for referring your patient, Justin Morales, to the [facility]. Please see a copy of my visit note below.    Video-Visit Details     Type of service:  Video Visit     Video Start Time (time video started): 8:01am     Video End Time (time video stopped): 8:33am    Originating Location (pt. Location): Home     Distant Location (provider location):  [facility]      Mode of Communication:  Video Conference via [facility] - ASSESSMENT NOTE    Justin Morales 31 year old referred for MNT related to overweight/ weight management     Time Spent: 32 minutes  Visit Type: video  Referring Physician: Sammi Blanco 12/14/21  Z68.31 (ICD-10-CM) - BMI 31.0-31.9,adult  Pt accompanied by: self    NUTRITION HISTORY  Current diet: Vegetarian    Justin presents today with desire to obtain guidance for weight management and weight loss.   -He has a history of successful weight loss with activity and diet 'extreme fat smash diet'.    -He engaged in routine meal planning including mostly lean proteins and non-starch vegetables such as chicken eggs, broccoli, nuts, salad kits, 'a very paired down diet'.    -When he met his wife he switched to a vegetarian diet and slowly gained back some weight.    -He had been tracking his calories and weighing his foods from Feb to July of 2021, aiming for 8997-1116 calories/day.  -With this he was not successful with weight loss.   -His focus was ~2200 calories/day but would often add calories, 400-600 with activity.   -Justin does all the cooking in his house and all meal prep for the week.  His wife follows a Vegan diet.    -He avoid most calorie containing beverages other than alcohol beverages, ~1/day or more on the weekends, socically. He understands that alcohol calories can  "add up quickly and would like to cut back.   -His breakfasts vary and tend to be 'less healthy' as he often grabs and goes, getting to work as early as 7:30. He often feels hungry in the morning and has cravings after a light, non-sustainable breakfast.       Diet Recall  Breakfast Vary - raisin bran OR luna azul rice cereal OR bagel and tends to feel hungry during the day   Lunch 1 cup tofu, 1 cup rice, 1 cup broccoli with olive oil   Dinner Algerian Foh OR spring mix greenn sald with peppers, cukes, poppy seed dressing OR vegan cheese, crackers, nuts  After dinner likes some type of sweet or etoh bevg   Snacks Tries not to snack or has pnb or cheese crackers or nuts or popcorn   Beverages 16 oz coffee, 48 oz water, 1 etoh beverage     ANTHROPOMETRICS  Height: 5'11\"  Weight: 232 lb/105kg  BMI: 31  Weight Status:  Obesity Grade I BMI 30-34.9  Weight History:   Wt Readings from Last 8 Encounters:   12/14/21 105.4 kg (232 lb 6.4 oz)   05/11/21 102.8 kg (226 lb 9.6 oz)   03/15/21 102.5 kg (225 lb 14.4 oz)   02/18/21 103 kg (227 lb)   02/12/21 102.1 kg (225 lb)   02/05/21 101.6 kg (224 lb)   02/10/20 104.5 kg (230 lb 6.4 oz)   01/23/20 104 kg (229 lb 3.2 oz)     Dosing Weight: 85kg      Medications/vitamins/minerals/herbals:   Reviewed    Labs:  Labs reviewed    ASSESSED NUTRITION NEEDS:  Estimated Energy Needs: 8505-8912 kcals (20-25 Kcal/Kg)  Justification: overweight  Estimated Protein Needs: 85 grams protein (1 g pro/Kg)  Justification: maintenance    NUTRITION DIAGNOSIS:  Obesity related to self-monitoring deficit, excessive caloric intake AEB diet recall BMI >35    INTERVENTIONS  Provided written & verbal education:   --Discussed dietary and behavioral modifications to promote weight loss (fiber sources, protein sources, exercise and avoidance of disordered eating patterns, skipping meals).    --Reviewed nutrition needs for desired wt loss. Encouraged to aim for 9961-9038 calories, 85g protein and 25g fiber/day. " Reviewed fiber and protein intake.  Encouraged to have fiber and protein source with each meal, especially breakfast.  Encouraged to keep calories at baseline of 5819-9450/day even with exercise.    --Justin is interested in metabolic testing.  RD offered RMR testing in 2-3 weeks.       Provided pt with corresponding education materials/handouts on:  Batch cooked recipes - sent via email    Pt verbalize understanding of materials provided during consult.   Patient Understanding: Excellent  Expected Compliance: Excellent    Goals  1.  Aim 1700-2000kcal /day   2.  Continue tracking daily intake on MFP or soledad/website of choice  3. Weight loss (0.5-1.0 lb /week desirable)      Follow-Up Plans: Pt has RD contact information for questions.  Scheduled for 2-3 weeks for RMR testing.    MONITORING AND EVALUATION:  -Food intake  -Weight trends    Ese Hernandez RDN, LDN          Again, thank you for allowing me to participate in the care of your patient.      Sincerely,    Ese Hernandez RD

## 2022-03-29 ENCOUNTER — OFFICE VISIT (OUTPATIENT)
Dept: FAMILY MEDICINE | Facility: CLINIC | Age: 32
End: 2022-03-29
Payer: COMMERCIAL

## 2022-03-29 VITALS
OXYGEN SATURATION: 98 % | HEIGHT: 72 IN | BODY MASS INDEX: 31.15 KG/M2 | DIASTOLIC BLOOD PRESSURE: 77 MMHG | SYSTOLIC BLOOD PRESSURE: 110 MMHG | WEIGHT: 230 LBS | HEART RATE: 82 BPM | TEMPERATURE: 99.7 F | RESPIRATION RATE: 16 BRPM

## 2022-03-29 DIAGNOSIS — Z00.00 ROUTINE GENERAL MEDICAL EXAMINATION AT A HEALTH CARE FACILITY: ICD-10-CM

## 2022-03-29 DIAGNOSIS — M54.50 PAIN IN LEFT LUMBAR REGION OF BACK: ICD-10-CM

## 2022-03-29 DIAGNOSIS — Z13.88 SCREENING FOR LEAD EXPOSURE: ICD-10-CM

## 2022-03-29 DIAGNOSIS — Z77.018 EXPOSURE TO MERCURY: ICD-10-CM

## 2022-03-29 DIAGNOSIS — Z13.9 SCREENING FOR CONDITION: Primary | ICD-10-CM

## 2022-03-29 LAB
ALBUMIN SERPL-MCNC: 4.4 G/DL (ref 3.4–5)
ALP SERPL-CCNC: 76 U/L (ref 40–150)
ALT SERPL W P-5'-P-CCNC: 25 U/L (ref 0–70)
ANION GAP SERPL CALCULATED.3IONS-SCNC: 7 MMOL/L (ref 3–14)
AST SERPL W P-5'-P-CCNC: 20 U/L (ref 0–45)
BILIRUB SERPL-MCNC: 0.6 MG/DL (ref 0.2–1.3)
BUN SERPL-MCNC: 10 MG/DL (ref 7–30)
CALCIUM SERPL-MCNC: 9 MG/DL (ref 8.5–10.1)
CHLORIDE BLD-SCNC: 107 MMOL/L (ref 94–109)
CO2 SERPL-SCNC: 26 MMOL/L (ref 20–32)
CREAT SERPL-MCNC: 0.87 MG/DL (ref 0.66–1.25)
GFR SERPL CREATININE-BSD FRML MDRD: >90 ML/MIN/1.73M2
GLUCOSE BLD-MCNC: 95 MG/DL (ref 70–99)
POTASSIUM BLD-SCNC: 3.9 MMOL/L (ref 3.4–5.3)
PROT SERPL-MCNC: 7.4 G/DL (ref 6.8–8.8)
SODIUM SERPL-SCNC: 140 MMOL/L (ref 133–144)

## 2022-03-29 PROCEDURE — 99213 OFFICE O/P EST LOW 20 MIN: CPT | Mod: 25 | Performed by: STUDENT IN AN ORGANIZED HEALTH CARE EDUCATION/TRAINING PROGRAM

## 2022-03-29 PROCEDURE — 80053 COMPREHEN METABOLIC PANEL: CPT | Performed by: STUDENT IN AN ORGANIZED HEALTH CARE EDUCATION/TRAINING PROGRAM

## 2022-03-29 PROCEDURE — 99395 PREV VISIT EST AGE 18-39: CPT | Mod: GC | Performed by: STUDENT IN AN ORGANIZED HEALTH CARE EDUCATION/TRAINING PROGRAM

## 2022-03-29 PROCEDURE — 36415 COLL VENOUS BLD VENIPUNCTURE: CPT | Performed by: STUDENT IN AN ORGANIZED HEALTH CARE EDUCATION/TRAINING PROGRAM

## 2022-03-29 PROCEDURE — 83655 ASSAY OF LEAD: CPT | Mod: 90 | Performed by: STUDENT IN AN ORGANIZED HEALTH CARE EDUCATION/TRAINING PROGRAM

## 2022-03-29 NOTE — PROGRESS NOTES
[place]  Male Physical Note          HPI         Concerns today: Low back pain. Injured back 4 years ago while weight lifting, felt a pop at the time. Pain in left side low back. Does not radiate. Went to primary and was sent to PT. 3 in person visits, then continued exercises anything between 3x-daily. Reaggrivated 1 year later (2019), went back to PT, saw them 6 times and then continued working on exercises every other day to every day. Mainly stretches but some strength training. Goal was to get back to doing more weight lifting but hasn't gotten to this point. Will go to the gym 2-3 times and do arms and then will have a flare.  . Feeling frustrated because feels the PT has weight loss another factor, has reduced calories and feels he can't do more in terms of exercise due to pain. Can't shovel, rake leaves, dress/undres normally. Did see chiropracter and got an xray but doesn't recall what results were.     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 Would like to get levels checked. Also has other fumes that he is exposed to and worries about these things.     Patient Active Problem List   Diagnosis     ADHD (attention deficit hyperactivity disorder)     Generalized anxiety disorder with panic attacks       Past Medical History:   Diagnosis Date     ADHD      Anxiety        Previous Medical Care      History reviewed. No pertinent family history.           Review of Systems:     Review of Systems:  CONSTITUTIONAL: NEGATIVE for fever, chills, change in weight  INTEGUMENTARY/SKIN: NEGATIVE for worrisome rashes, moles or lesions  EYES: NEGATIVE for vision changes or irritation  ENT/MOUTH: NEGATIVE for ear, mouth and throat problems  RESP: NEGATIVE for significant cough or SOB  CV: NEGATIVE for chest pain, palpitations or peripheral edema  GI: NEGATIVE for nausea, abdominal pain, heartburn, or change in bowel habits  : NEGATIVE for frequency, dysuria, or  hematuria  MUSCULOSKELETAL:POSITIVE for low back pain  NEURO: NEGATIVE for weakness, dizziness or paresthesias  ENDOCRINE: NEGATIVE for temperature intolerance, skin/hair changes  HEME/ALLERGY: NEGATIVE for bleeding problems  PSYCHIATRIC: NEGATIVE for changes in mood or affect    Sleep:   Do you snore most or the night (as reported by a family member)? No  Do you feel sleepy or extremely tired during most of the day? Goes to sleep at 9:00-9:15 pm nightly and sleeps a litle over 8 hours and does well with this. Fatigue if he sleeps less.          Social History     Social History     Socioeconomic History     Marital status:      Spouse name: Not on file     Number of children: Not on file     Years of education: Not on file     Highest education level: Not on file   Occupational History     Not on file   Tobacco Use     Smoking status: Never Smoker     Smokeless tobacco: Never Used   Substance and Sexual Activity     Alcohol use: Yes     Comment: socially      Drug use: Never     Sexual activity: Yes     Partners: Female   Other Topics Concern     Not on file   Social History Narrative     Not on file     Social Determinants of Health     Financial Resource Strain: Not on file   Food Insecurity: Not on file   Transportation Needs: Not on file   Physical Activity: Not on file   Stress: Not on file   Social Connections: Not on file   Intimate Partner Violence: Not on file   Housing Stability: Not on file       Marital Status:  Who lives in your household? Wife and him    Has anyone hurt you physically, for example by pushing, hitting, slapping or kicking you or forcing you to have sex? Denies  Do you feel threatened or controlled by a partner, ex-partner or anyone in your life? Denies    Sexual Health     Sexual concerns: No   STI History: Neg      Recommended Screening   No Recommended Screenings            Physical Exam:     Vitals: /77   Pulse 82   Temp 99.7  F (37.6  C) (Oral)   Resp 16    "Ht 1.82 m (5' 11.65\")   Wt 104.3 kg (230 lb)   SpO2 98%   BMI 31.50 kg/m    BMI= Body mass index is 31.5 kg/m .  GENERAL: healthy, alert and no distress  EYES: Eyes grossly normal to inspection, extraocular movements - intact, and PERRL  HENT: ear canals- normal; TMs- normal; Nose- normal; Mouth- no ulcers, no lesions  NECK: no tenderness, no adenopathy, no asymmetry, no masses, no stiffness; thyroid- normal to palpation  RESP: lungs clear to auscultation - no rales, no rhonchi, no wheezes  CV: regular rates and rhythm, normal S1 S2, no S3 or S4 and no murmur, no click or rub -  ABDOMEN: soft, no tenderness, no  hepatosplenomegaly, no masses, normal bowel sounds  MS: extremities- no gross deformities noted, no edema  SKIN: no suspicious lesions, no rashes  NEURO: strength and tone- normal, sensory exam- grossly normal, mentation- intact, speech- normal, reflexes- symmetric  Comprehensive back pain exam:  Tenderness of palpation of spinous process of lumbar and sacral spine L4-S1, and left paraspinal muscle tenderness, Range of motion not limited by pain, however patient reports this is a \"good day\", Lower extremity strength functional and equal on both sides, Lower extremity reflexes within normal limits bilaterally, Lower extremity sensation normal and equal on both sides and Straight leg raise negative bilaterally. Hip with full range of motion bilaterally without pain.  RECTAL- male: no masses, no hemorhoids, Prostate- symmetric, no  nodularity, no masses, no hypertrophy  PSYCH: Alert and oriented times 3; speech- coherent , normal rate and volume; able to articulate logical thoughts, able to abstract reason, no tangential thoughts, no hallucinations or delusions, affect- normal  LYMPHATICS: ant. cervical- normal, post. cervical- normal, axillary- normal, supraclavicular- normal    Assessment and Plan      Justin was seen today for physical and back pain.    Diagnoses and all orders for this " visit:    Routine general medical examination at a health care facility      Pain in left lumbar region of back  Initial injury to low back while weight lifting 4 years ago.  Has worked with physical therapy on several occasions since initial injury but has not returned to prior level of function. No records of prior imaging. Will obtain xray today and recommend pool therapy. We will consider further imaging and specialist follow up based on success with pool therapy and results of xray.   -     Physical Therapy Referral; Future  -     X-ray lumbar spine 2-3 views*; Future    Screening for condition  Works for [employer] in chemistry department and is frequently exposed to numerous types of fumes. Will start with CMP and consider further screening testing in the future, such as pulmonary testing and/or imaging.   -     Comprehensive metabolic panel    Screening for lead exposure  Frequent lead exposure through work. Screening labs today  -     Lead Venous Blood Confirm    Exposure to mercury  Frequent lead exposure through work. Screening with 24 hour urinary testing. Pt to complete at home and return after completing.   -     Mercury urine; Future      Options for treatment and follow-up care were reviewed with the patient. Justin Morales and/or guardian engaged in the decision making process and verbalized understanding of the options discussed and agreed with the final plan.    Sandra Flood MD

## 2022-03-29 NOTE — PATIENT INSTRUCTIONS
Patient Education   Here is the plan from today's visit    1. Routine general medical examination at a health care facility  2. Pain in left lumbar region of back  Great to meet you today! Overall you're looking well but I'm sorry to hear about your struggles with back pain. Let's try the pool therapy and get an xray of the back and then we can discuss next steps based on how things go from here.   - Physical Therapy Referral; Future  - X-ray lumbar spine 2-3 views*; Future    3. Screening for condition  Will start with CMP today to check liver and kidney function as well as electrolytes and consider further screening testing in the future, such as pulmonary testing and/or imaging.   - Comprehensive metabolic panel    4. Screening for lead exposure  Labs today. You will receive results via [employer]  - Lead Venous Blood Confirm    5. Exposure to mercury  Plan for 24 hour urine collection for mercury testing. I would recommend doing this on a day when you will be home and can easily collect your urine. You will receive results via [employer]  - Mercury urine; Future      Please call or return to clinic if your symptoms don't go away.    Follow up plan  No follow-ups on file.    Thank you for coming to [facility] today.  Lab Testing:  **If you had lab testing today and your results are reassuring or normal they will be mailed to you or sent through [employer] within 7 days.   **If the lab tests need quick action we will call you with the results.  **If you are having labs done on a different day, please call [phone] to schedule at [facility] or [phone] for other [facility] locations. Labs do not offer walk-in appointments.  The phone number we will call with results is # [phone] (home) . If this is not the best number please call our clinic and change the number.  Medication Refills:  If you need any refills please call your pharmacy and they will contact us.   If you need to   your refill at a new pharmacy, please contact the new pharmacy directly. The new pharmacy will help you get your medications transferred faster.   Scheduling:  If you have any concerns about today's visit or wish to schedule another appointment please call our office during normal business hours [phone] (8-5:00 M-F)  If a referral was made to an [facility] specialty provider and you do not get a call from central scheduling, please refer to directions on your visit summary or call our office during normal business hours for assistance.   If a Mammogram was ordered for you at the [facility] call [phone] to schedule or change your appointment.  If you had an XRay/CT/Ultrasound/MRI ordered the number is [phone] to schedule or change your radiology appointment.   [facility] has limited ultrasound appointments available on Wednesdays, if you would like your ultrasound at [facility], please call [phone] to schedule.   Medical Concerns:  If you have urgent medical concerns please call [phone] at any time of the day.    Sandra Flood MD       Preventive Health Recommendations  Male Ages 26 - 39    Yearly exam:             See your health care provider every year in order to  o   Review health changes.   o   Discuss preventive care.    o   Review your medicines if your doctor has prescribed any.    You should be tested each year for STDs (sexually transmitted diseases), if you re at risk.     After age 35, talk to your provider about cholesterol testing. If you are at risk for heart disease, have your cholesterol tested at least every 5 years.     If you are at risk for diabetes, you should have a diabetes test (fasting glucose).  Shots: Get a flu shot each year. Get a tetanus shot every 10 years.     Nutrition:    Eat at least 5 servings of fruits and vegetables daily.     Eat whole-grain bread, whole-wheat pasta and brown rice instead of white grains and rice.     Get  adequate Calcium and Vitamin D.     Lifestyle    Exercise for at least 150 minutes a week (30 minutes a day, 5 days a week). This will help you control your weight and prevent disease.     Limit alcohol to one drink per day.     No smoking.     Wear sunscreen to prevent skin cancer.     See your dentist every six months for an exam and cleaning.     Preventive Health Recommendations  Male Ages 26 - 39    Yearly exam:             See your health care provider every year in order to  o   Review health changes.   o   Discuss preventive care.    o   Review your medicines if your doctor has prescribed any.    You should be tested each year for STDs (sexually transmitted diseases), if you re at risk.     After age 35, talk to your provider about cholesterol testing. If you are at risk for heart disease, have your cholesterol tested at least every 5 years.     If you are at risk for diabetes, you should have a diabetes test (fasting glucose).  Shots: Get a flu shot each year. Get a tetanus shot every 10 years.     Nutrition:    Eat at least 5 servings of fruits and vegetables daily.     Eat whole-grain bread, whole-wheat pasta and brown rice instead of white grains and rice.     Get adequate Calcium and Vitamin D.     Lifestyle    Exercise for at least 150 minutes a week (30 minutes a day, 5 days a week). This will help you control your weight and prevent disease.     Limit alcohol to one drink per day.     No smoking.     Wear sunscreen to prevent skin cancer.     See your dentist every six months for an exam and cleaning.

## 2022-03-30 NOTE — PROGRESS NOTES
Preceptor Attestation:   Patient seen, evaluated and discussed with the resident. I have verified the content of the note, which accurately reflects my assessment of the patient and the plan of care.   Supervising Physician:  Sammi Blanoc MD

## 2022-03-31 ENCOUNTER — ANCILLARY PROCEDURE (OUTPATIENT)
Dept: GENERAL RADIOLOGY | Facility: CLINIC | Age: 32
End: 2022-03-31
Attending: FAMILY MEDICINE
Payer: COMMERCIAL

## 2022-03-31 DIAGNOSIS — M54.50 PAIN IN LEFT LUMBAR REGION OF BACK: ICD-10-CM

## 2022-03-31 PROCEDURE — 72100 X-RAY EXAM L-S SPINE 2/3 VWS: CPT | Mod: GC | Performed by: RADIOLOGY

## 2022-04-01 LAB — LEAD BLDV-MCNC: <2 UG/DL

## 2022-04-11 ENCOUNTER — LAB (OUTPATIENT)
Dept: LAB | Facility: CLINIC | Age: 32
End: 2022-04-11
Payer: COMMERCIAL

## 2022-04-11 DIAGNOSIS — Z77.018 EXPOSURE TO MERCURY: ICD-10-CM

## 2022-04-11 PROCEDURE — 83825 ASSAY OF MERCURY: CPT | Mod: 90

## 2022-04-11 PROCEDURE — 99000 SPECIMEN HANDLING OFFICE-LAB: CPT

## 2022-04-14 LAB
CREAT 24H UR-MRATE: 1638 MG/D
CREAT UR-MCNC: 53 MG/DL
MERCURY 24H UR-MRATE: NORMAL UG/D
MERCURY UR-MCNC: <2.5 UG/L
MERCURY/CREAT UR: NORMAL UG/G CRT

## 2022-10-23 ENCOUNTER — HEALTH MAINTENANCE LETTER (OUTPATIENT)
Age: 32
End: 2022-10-23

## 2023-03-07 ENCOUNTER — OFFICE VISIT (OUTPATIENT)
Dept: FAMILY MEDICINE | Facility: CLINIC | Age: 33
End: 2023-03-07
Payer: COMMERCIAL

## 2023-03-07 VITALS
SYSTOLIC BLOOD PRESSURE: 118 MMHG | WEIGHT: 226.5 LBS | BODY MASS INDEX: 31.71 KG/M2 | DIASTOLIC BLOOD PRESSURE: 75 MMHG | OXYGEN SATURATION: 99 % | RESPIRATION RATE: 16 BRPM | HEIGHT: 71 IN | TEMPERATURE: 98.8 F | HEART RATE: 65 BPM

## 2023-03-07 DIAGNOSIS — M54.50 PAIN IN LEFT LUMBAR REGION OF BACK: Primary | ICD-10-CM

## 2023-03-07 PROCEDURE — 99213 OFFICE O/P EST LOW 20 MIN: CPT | Performed by: FAMILY MEDICINE

## 2023-03-07 NOTE — PROGRESS NOTES
"  Assessment & Plan     Pain in left lumbar region of back- left PSIS, pelvic alignment  Initial low back injury in 2018 during weight lifting, at which time patient was engaged in physical therapy.  Reinjury in 2019, with gradual improvement with physical therapy, pool therapy, and working with a chiropractor.  Patient was last seen in clinic and March 2022, at which time patient had not yet resumed weight lifting, and was still struggling with routine activities such as shoveling snow.  More recently over the past 3 months patient has been going to the gym more regularly, and resumed weight lifting, though had recurrence of low back pain 1 week ago, now with left-sided sciatica. Physical exam negative for SLR. Low back pain elicited on extension of bilateral knees against resistance. No alarm sx concerning for cauda equina or conus medullaris.   X-ray 3/31/2022: Mild degenerative changes of the lumbar spine, most pronounced at L4-S1 with mild disc space narrowing and lower lumbar predominant facet arthropathy.   - Physical Therapy Referral; Future- See Nya Mulligan in person    Return in about 6 weeks (around 4/18/2023), or if symptoms worsen or fail to improve.    Keena Pino MD      Bladimir Anderson is a 33 year old, presenting for the following health issues:  Back Pain (Pt reports since Wednesday lower back pain going down to left leg. No MVA or WC. He suspicious could be relate with his work out on Monday.)      HPI     From physical 3/29/2022: \"Low back pain. Injured back in 2018 while weight lifting.  Physical therapy with 3 in person visits, then continued exercises anything between 3x-daily. Reaggrivated 1 year later (2019), went back to PT, saw them 6 times and then continued working on exercises every other day to every day. Goal was to get back to doing more weight lifting but hasn't gotten to this point yet. Can't shovel, rake leaves, dress/undress normally\"    Imaging 3/31/2022: Mild " "degenerative changes of the lumbar spine, most pronounced at L4-S1 with mild disc space narrowing and lower lumbar predominant facet arthropathy.     Has been seeing a chiropractor over the past year and pool therapy, which had been helping. Back in the gym for 3 months, feeling optimistic. Last week, 36 hours after gym with shooting pain in the same spot, now radiating down the legs. Mostly on left side, some on right. When standing from sitting.    Tried ibuprofen in the past, but doesn't help with back pain. Has been icing/hot pack alternating. Not helping.       Review of Systems    ROS: 10 point ROS neg other than the symptoms noted above in the HPI.        Objective    /75 (BP Location: Left arm, Patient Position: Sitting, Cuff Size: Adult Large)   Pulse 65   Temp 98.8  F (37.1  C) (Oral)   Resp 16   Ht 1.803 m (5' 11\")   Wt 102.7 kg (226 lb 8 oz)   SpO2 99%   BMI 31.59 kg/m    Body mass index is 31.59 kg/m .  Physical Exam  Vitals reviewed.   Constitutional:       Appearance: Normal appearance.   Eyes:      Conjunctiva/sclera: Conjunctivae normal.   Cardiovascular:      Rate and Rhythm: Normal rate and regular rhythm.      Heart sounds: Normal heart sounds.   Pulmonary:      Effort: Pulmonary effort is normal.      Breath sounds: Normal breath sounds.   Musculoskeletal:         General: No swelling.      Lumbar back: No swelling, tenderness or bony tenderness. Negative right straight leg raise test and negative left straight leg raise test.   Skin:     General: Skin is warm and dry.   Neurological:      Mental Status: He is alert.   Psychiatric:         Mood and Affect: Mood normal.         Behavior: Behavior normal.         Thought Content: Thought content normal.         Judgment: Judgment normal.             Patient seen, evaluated and discussed with the resident. I have verified the content of the note, which accurately reflects my assessment of the patient and the plan of care.   Supervising " Physician:  Sammi Blanco MD

## 2023-03-31 ENCOUNTER — THERAPY VISIT (OUTPATIENT)
Dept: PHYSICAL THERAPY | Facility: CLINIC | Age: 33
End: 2023-03-31
Attending: FAMILY MEDICINE
Payer: COMMERCIAL

## 2023-03-31 DIAGNOSIS — M54.50 PAIN IN LEFT LUMBAR REGION OF BACK: ICD-10-CM

## 2023-03-31 PROCEDURE — 97110 THERAPEUTIC EXERCISES: CPT | Mod: GP

## 2023-03-31 NOTE — PROGRESS NOTES
Physical Therapy Initial Evaluation  3/31/2023     Precautions/Restrictions/MD instructions: eval and treat    Therapist Assessment: Justin Morales is a 33 year old male patient presenting to Physical Therapy with acute on chronic low back pain. Patient demonstrates limited lumbar ROM, positive response to repeated extension and poor posture. These impairments limit their ability to sit, sit<>stand transfer, bend forward, bike and walk without pain. Skilled PT services are necessary in order to reduce impairments and improve independent function.    SUBJECTIVE    Chief Complaint: Back pain  Associated symptoms: radiating pain down R and L lateral thigh  Paresthesia (yes/no):  no  Onset date: Has had many episodes of LBP, most recent episode from 5 weeks ago  Symptoms commenced as a result of: unknown- had done a typical workout in the gym with    Pain severity: 6/10 current  Location of pain: L lumbar spine  Quality of Pain: achy, sore, shooting  Better: laying supine or prone,   Worse:  sitting, sit<>stand, bending forward, biking, walking  Time of day: activity/position dependent  Progression of Symptoms since onset:  Since onset, these symptoms are improving  Previous treatment: has included PT, pool therapy, chiropractor; effect was good  Previous Functional Status:  fully functional prior to pain onset/injury.        General health as reported by patient: fair.    PMH: overweight   Surgical history/trauma: none  Imaging: xray  Medications: none    Occupation:  at [employer] Job duties: standing, sitting, pushing carts, carry buckets   Current exercise regimen/Recreational activities: walking, Strength training in the gym  Barriers to treatment: none        Patient's Goal(s): decrease pain with sitting, biking, sit<>stand     OBJECTIVE    Posture:   Sitting:  Posterior pelvic tilt  Standing: decreased lordosis        Movement Loss:   Anil Mod Min Nil Pain   Flexion  x   Limited by HS   Extension   x     Side  Gliding R   x  + L side   Side Gliding L   x       Neural Tension:   Slump: + L (LBP)  SLR: negative B  Motor deficit:  5/5 LE myotomes      Other Tests:   SIJ Tests Positive (+)/Negative (-)/painful but not provocative of patient complaint (+/-)   Thigh thrust + L   Sacral thrust + L   Distraction +L   Compression    Gaenslen's      Hip ROM   Hip Flexion Hip ER Hip IR Hip Extension   Left       Right         Flexibility:    Hamstrings   Left moderate   Right moderate       Tested Movements  Directional Preference: extension  Repeated Extension Test: decreased pain, improved ROM    ASSESSMENT/PLAN  Patient is a 33 year old male with lumbar complaints.    Patient has the following significant findings with corresponding treatment plan.                Diagnosis 1:  Acute on chronic LBP    Pain -  hot/cold therapy, manual therapy, splint/taping/bracing/orthotics, self management, education, directional preference exercise and home program  Decreased ROM/flexibility - manual therapy, therapeutic exercise and home program  Decreased joint mobility - manual therapy, therapeutic exercise, therapeutic activity and home program  Decreased strength - therapeutic exercise, therapeutic activities and home program  Impaired posture - neuro re-education and home program    Therapy Evaluation Codes:   1) History comprised of:   Personal factors that impact the plan of care:      Time since onset of symptoms.    Comorbidity factors that impact the plan of care are:      Overweight.     Medications impacting care: None.  2) Examination of Body Systems comprised of:   Body structures and functions that impact the plan of care:      Lumbar spine.   Activity limitations that impact the plan of care are:      Bending, Sitting, Standing and Walking.  3) Clinical presentation characteristics are:   Stable/Uncomplicated.  4) Decision-Making    Low complexity using standardized patient assessment instrument and/or measureable assessment of  functional outcome.  Cumulative Therapy Evaluation is: Low complexity.    Previous and current functional limitations:  (See Goal Flow Sheet for this information)    Short term and Long term goals: (See Goal Flow Sheet for this information)     Communication ability:  Patient appears to be able to clearly communicate and understand verbal and written communication and follow directions correctly.  Treatment Explanation - The following has been discussed with the patient:   RX ordered/plan of care  Anticipated outcomes  Possible risks and side effects  This patient would benefit from PT intervention to resume normal activities.   Rehab potential is good.    Frequency:  1 X week, once daily  Duration:  for 6 weeks  Discharge Plan:  Achieve all LTG.  Independent in home treatment program.  Reach maximal therapeutic benefit.    Please refer to the daily flowsheet for treatment today, total treatment time and time spent performing 1:1 timed codes.

## 2023-04-15 ENCOUNTER — HOSPITAL ENCOUNTER (EMERGENCY)
Facility: CLINIC | Age: 33
Discharge: HOME | End: 2023-04-15
Attending: STUDENT IN AN ORGANIZED HEALTH CARE EDUCATION/TRAINING PROGRAM | Admitting: STUDENT IN AN ORGANIZED HEALTH CARE EDUCATION/TRAINING PROGRAM
Payer: COMMERCIAL

## 2023-04-15 VITALS
RESPIRATION RATE: 18 BRPM | OXYGEN SATURATION: 99 % | SYSTOLIC BLOOD PRESSURE: 121 MMHG | DIASTOLIC BLOOD PRESSURE: 88 MMHG | HEART RATE: 80 BPM | TEMPERATURE: 98 F

## 2023-04-15 DIAGNOSIS — M54.42 ACUTE LEFT-SIDED LOW BACK PAIN WITH LEFT-SIDED SCIATICA: ICD-10-CM

## 2023-04-15 PROCEDURE — 99284 EMERGENCY DEPT VISIT MOD MDM: CPT | Performed by: STUDENT IN AN ORGANIZED HEALTH CARE EDUCATION/TRAINING PROGRAM

## 2023-04-15 PROCEDURE — 250N000013 HC RX MED GY IP 250 OP 250 PS 637: Performed by: STUDENT IN AN ORGANIZED HEALTH CARE EDUCATION/TRAINING PROGRAM

## 2023-04-15 RX ORDER — IBUPROFEN 600 MG/1
600 TABLET, FILM COATED ORAL ONCE
Status: COMPLETED | OUTPATIENT
Start: 2023-04-15 | End: 2023-04-15

## 2023-04-15 RX ORDER — IBUPROFEN 600 MG/1
600 TABLET, FILM COATED ORAL EVERY 6 HOURS PRN
Qty: 28 TABLET | Refills: 0 | Status: SHIPPED | OUTPATIENT
Start: 2023-04-15 | End: 2024-04-29

## 2023-04-15 RX ORDER — GABAPENTIN 600 MG/1
600 TABLET ORAL ONCE
Status: COMPLETED | OUTPATIENT
Start: 2023-04-15 | End: 2023-04-15

## 2023-04-15 RX ORDER — LIDOCAINE 4 G/G
1 PATCH TOPICAL
Status: DISCONTINUED | OUTPATIENT
Start: 2023-04-15 | End: 2023-04-15 | Stop reason: HOSPADM

## 2023-04-15 RX ORDER — CYCLOBENZAPRINE HCL 10 MG
10 TABLET ORAL AT BEDTIME
Qty: 7 TABLET | Refills: 0 | Status: SHIPPED | OUTPATIENT
Start: 2023-04-15 | End: 2023-05-03

## 2023-04-15 RX ORDER — LIDOCAINE 50 MG/G
1 PATCH TOPICAL EVERY 24 HOURS
Qty: 7 PATCH | Refills: 0 | Status: SHIPPED | OUTPATIENT
Start: 2023-04-15 | End: 2023-04-15

## 2023-04-15 RX ORDER — LIDOCAINE 50 MG/G
1 PATCH TOPICAL EVERY 24 HOURS
Qty: 7 PATCH | Refills: 0 | Status: SHIPPED | OUTPATIENT
Start: 2023-04-15 | End: 2023-05-03

## 2023-04-15 RX ORDER — CYCLOBENZAPRINE HCL 10 MG
10 TABLET ORAL AT BEDTIME
Qty: 7 TABLET | Refills: 0 | Status: SHIPPED | OUTPATIENT
Start: 2023-04-15 | End: 2023-04-15

## 2023-04-15 RX ORDER — IBUPROFEN 600 MG/1
600 TABLET, FILM COATED ORAL EVERY 6 HOURS PRN
Qty: 28 TABLET | Refills: 0 | Status: SHIPPED | OUTPATIENT
Start: 2023-04-15 | End: 2023-04-15

## 2023-04-15 RX ORDER — GABAPENTIN 100 MG/1
100 CAPSULE ORAL 3 TIMES DAILY
Qty: 42 CAPSULE | Refills: 0 | Status: SHIPPED | OUTPATIENT
Start: 2023-04-15 | End: 2023-04-18

## 2023-04-15 RX ADMIN — IBUPROFEN 600 MG: 600 TABLET, FILM COATED ORAL at 13:30

## 2023-04-15 RX ADMIN — GABAPENTIN 600 MG: 600 TABLET, FILM COATED ORAL at 13:30

## 2023-04-15 ASSESSMENT — ACTIVITIES OF DAILY LIVING (ADL): ADLS_ACUITY_SCORE: 35

## 2023-04-15 NOTE — DISCHARGE INSTRUCTIONS
You were seen in the ED today and found to have sciatic back pain.  This typically gets better on its own, but may take a number of weeks for this to improve.  We would recommend using medications as given here, and follow-up with your regular doctor about possibly restarting physical therapy.  Return to the emergency department with any worsening symptoms including severe pain you are unable to tolerate at home, loss of bowel or bladder control, inability to move your legs properly, loss of sensation in your legs, or any other concerning symptoms    Instructions from your doctor today:  Emergency Department testing is focused on the potential causes of your symptoms that are the most dangerous possibilities, and cannot cover every possibility. Based on the evaluation, it was deemed sufficiently safe to discharge and continue management through the clinics. Thus, follow-up is very important to assess for improvement/worsening, potential further testing, and potential treatment adjustments. If you were given opioid pain medications or other medications that can make you drowsy while in the ED, you should not drive for at least several hours and not until you feel completely back to normal.     Please make an appointment to follow up with:  - Your Primary Care Provider in 5-7 days as needed.  - If you do not have a primary care provider, you can be seen in follow-up and establish care by calling any of the clinics below:     - [facility] (phone: [phone])     - Primary Care / [facility] (phone: [phone])   - Have your clinic provider review the results from today's visit with you again, including any potential follow-up or additional testing that may be needed based on the results. Occasionally, incidental findings are found on later review by radiologists that may need follow-up.

## 2023-04-15 NOTE — ED TRIAGE NOTES
From home  Back pain radiating to LLE, 9/10  Unable to sleep  Feels better when standing up   Taken ibu/apap w/o relief  covid+     Triage Assessment     Row Name 04/15/23 1302       Triage Assessment (Adult)    Airway WDL WDL       Respiratory WDL    Respiratory WDL WDL       Skin Circulation/Temperature WDL    Skin Circulation/Temperature WDL WDL       Cardiac WDL    Cardiac WDL WDL       Peripheral/Neurovascular WDL    Peripheral Neurovascular WDL WDL       Cognitive/Neuro/Behavioral WDL    Cognitive/Neuro/Behavioral WDL WDL

## 2023-04-15 NOTE — ED PROVIDER NOTES
[facility] DEPARTMENT ([facility])  4/15/23  History     Chief Complaint   Patient presents with     Back Pain     HPI  Justin Morales is a 33 year old male who presents to the ED from home for evaluation of back pain.    Per review of Dr. Blanco note on 3/7/2023, patient developed low back pain in 2018 following lifting heavy weights.  Patient was engaged in physical therapy but then reinjured in 2019.  Patient spent time in physical therapy, pool therapy and working with a chiropractor.  Over the past 4 months, the patient had been lifting weights more regularly but then in the first week of March again developed back pain this time with left-sided sciatica.  Patient underwent an x-ray on 3/31/2022 which showed mild degenerative changes of lumbar spine, most pronounced at L4-S1 with mild disc space narrowing and lower lumbar predominant facet arthropathy.    Developed back pain again along with left-sided sciatica which has been exacerbated over the last week as the patient has COVID, and has been laying in bed most of the time.  Notes that he has been unable to sleep, and is only comfortable when he is up standing around.  Gets pain radiating and sharp distribution from the left lower back down his leg all the way to his heel.  Has not noticed any weakness, no fevers, chills, midline back pain, no new trauma.  No loss of bowel or bladder control.    Past Medical History  Past Medical History:   Diagnosis Date     ADHD      Anxiety      No past surgical history on file.  No current outpatient medications on file.    No Known Allergies  Family History  No family history on file.  Social History   Social History     Tobacco Use     Smoking status: Never     Smokeless tobacco: Never   Substance Use Topics     Alcohol use: Yes     Comment: socially      Drug use: Never      Past medical history, past surgical history, medications, allergies, family history, and social history were reviewed with  the patient. No additional pertinent items.     Review of Systems  A medically appropriate review of systems was performed with pertinent positives and negatives noted in the HPI, and all other systems negative. and A complete review of systems was performed with pertinent positives and negatives noted in the HPI, and all other systems negative.    Physical Exam   BP: 125/79  Pulse: 109  Temp: 98  F (36.7  C)  Resp: 16  SpO2: 100 %      Physical Exam   GEN: Well appearing, non toxic, cooperative; standing, walking around and mildly uncomfortable  HEENT: normocephalic and atraumatic, PERRLA, EOMI  CV: well-perfused, normal skin color for ethnicity  PULM: breathing comfortably, in no respiratory distress  ABD: nondistended  EXT: Full range of motion.  No edema.  NEURO: awake, conversant, grossly normal bilateral upper and lower extremity strength & ROM; intact sensation of the entire bilateral lower extremities to general touch, normal strength intact of the lower extremities including flexion and extension of the hips, knees and ankles, as well as dorsiflexion and plantarflexion of the great toe bilaterally; normal reflexes.  SKIN: No rashes, ecchymosis, or lacerations  PSYCH: Calm and cooperative, interactive      ED Course, Procedures, & Data        Critical care was not performed.     Medical Decision Making  The patient's presentation was of low complexity (an acute and uncomplicated illness or injury).    The patient's evaluation involved:  review of external note(s) from 3+ sources (see separate area of note for details)  review of 3+ test result(s) ordered prior to this encounter (see separate area of note for details)    The patient's management necessitated moderate risk (prescription drug management including medications given in the ED).      Assessments & Plan   33-year-old male with a past medical history of lumbar back pain presents emergency department due to worsening back pain in his left lower back,  radiating into his left lower extremity with no red flag symptoms on evaluation.    Low suspicion of cord compression, cauda equina.  No point tenderness in the midline back with low suspicion of occult fracture.  Pain symptoms consistent with sciatica, and will treat with ibuprofen, patient is taking Tylenol at home and we would recommend continuing this, gabapentin, and lidocaine patch.  We will also send home with a short course of Flexeril to ensure he is able to sleep at night.    I have reviewed the nursing notes.    I have reviewed the findings, diagnosis, plan and need for follow up with the patient.    New Prescriptions    CYCLOBENZAPRINE (FLEXERIL) 10 MG TABLET    Take 1 tablet (10 mg) by mouth At Bedtime for 7 days    IBUPROFEN (ADVIL/MOTRIN) 600 MG TABLET    Take 1 tablet (600 mg) by mouth every 6 hours as needed for moderate pain    LIDOCAINE (LIDODERM) 5 % PATCH    Place 1 patch onto the skin every 24 hours for 7 days To prevent lidocaine toxicity, patient should be patch free for 12 hrs daily.       Final diagnoses:   Acute left-sided low back pain with left-sided sciatica       Aminata Flores MD  4/15/2023   [facility] EMERGENCY DEPARTMENT     Aminata Flores MD  04/15/23 3280

## 2023-04-18 ENCOUNTER — OFFICE VISIT (OUTPATIENT)
Dept: FAMILY MEDICINE | Facility: CLINIC | Age: 33
End: 2023-04-18
Payer: COMMERCIAL

## 2023-04-18 VITALS
HEART RATE: 82 BPM | TEMPERATURE: 98.2 F | OXYGEN SATURATION: 98 % | SYSTOLIC BLOOD PRESSURE: 130 MMHG | DIASTOLIC BLOOD PRESSURE: 80 MMHG | RESPIRATION RATE: 16 BRPM

## 2023-04-18 DIAGNOSIS — M54.50 PAIN IN LEFT LUMBAR REGION OF BACK: Primary | ICD-10-CM

## 2023-04-18 DIAGNOSIS — F41.0 GENERALIZED ANXIETY DISORDER WITH PANIC ATTACKS: ICD-10-CM

## 2023-04-18 DIAGNOSIS — Z13.88 SCREENING FOR LEAD EXPOSURE: ICD-10-CM

## 2023-04-18 DIAGNOSIS — F41.1 GENERALIZED ANXIETY DISORDER WITH PANIC ATTACKS: ICD-10-CM

## 2023-04-18 PROCEDURE — 83655 ASSAY OF LEAD: CPT | Mod: 90

## 2023-04-18 PROCEDURE — 99395 PREV VISIT EST AGE 18-39: CPT | Mod: GC

## 2023-04-18 PROCEDURE — 82175 ASSAY OF ARSENIC: CPT | Mod: 90

## 2023-04-18 PROCEDURE — 99000 SPECIMEN HANDLING OFFICE-LAB: CPT

## 2023-04-18 PROCEDURE — 99214 OFFICE O/P EST MOD 30 MIN: CPT | Mod: 25

## 2023-04-18 PROCEDURE — 36415 COLL VENOUS BLD VENIPUNCTURE: CPT

## 2023-04-18 PROCEDURE — 83825 ASSAY OF MERCURY: CPT | Mod: 90

## 2023-04-18 RX ORDER — GABAPENTIN 100 MG/1
200 CAPSULE ORAL 3 TIMES DAILY
Qty: 180 CAPSULE | Refills: 2 | Status: SHIPPED | OUTPATIENT
Start: 2023-04-18 | End: 2024-04-29

## 2023-04-18 ASSESSMENT — ENCOUNTER SYMPTOMS
HEMATOCHEZIA: 0
HEARTBURN: 0
ARTHRALGIAS: 1
SHORTNESS OF BREATH: 0
DIZZINESS: 0
HEADACHES: 0
JOINT SWELLING: 0
EYE PAIN: 0
HEMATURIA: 0
SORE THROAT: 0
ABDOMINAL PAIN: 0
NAUSEA: 0
DIARRHEA: 0
PALPITATIONS: 0
PARESTHESIAS: 0
NERVOUS/ANXIOUS: 0
CHILLS: 0
MYALGIAS: 0
CONSTIPATION: 0
DYSURIA: 0
FEVER: 0
COUGH: 0
FREQUENCY: 0
WEAKNESS: 0

## 2023-04-18 NOTE — PROGRESS NOTES
"SUBJECTIVE:   CC: Justin is an 33 year old who presents for preventative health visit.       4/18/2023     2:37 PM   Additional Questions   Roomed by Janel Reynaga MA   Accompanied by Self         4/18/2023     2:37 PM   Patient Reported Additional Medications   Patient reports taking the following new medications Yes     Patient has been advised of split billing requirements and indicates understanding: Yes     Back pain - Has been dealing with significant back pain for about 5 years now. Has gotten acutely worse over the past few weeks since he got diagnosed with COVID about a week ago.    Went to the ER a few days ago (Saturday) and got put on gabapentin at that time, which has been helping him sleep, has also helped the pain. Still has some bizarre discomfort - very painful when sitting but still painful when standing. Gabapentin has reduced pain by about 80%.    Has \"hot dagger\" pain right around left SI joint. Occasionally has had shooting pain and numbness down his L leg. While sleeping, pain running all the way down back of his thigh and into his calf and into his ankle.    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 Exposure to a lot of heavy metals at work (lead, others).    Hx of anxiety with flying, previously had two alprazolam.    Weight concerns - difficulty with weight management. Hx of referral to nutritionist, strictly followed caloric restrictions and tracked calories for 3 straight months (under 2000 calories)    230lb, 5'11\" (roughly 31 BMI)     Healthy Habits:     Getting at least 3 servings of Calcium per day:  Yes    Bi-annual eye exam:  NO    Dental care twice a year:  Yes    Sleep apnea or symptoms of sleep apnea:  None    Diet:  Vegetarian/vegan    Frequency of exercise:  6-7 days/week    Duration of exercise:  30-45 minutes    Taking medications regularly:  Yes    Medication side " effects:  None    PHQ-2 Total Score: 0    Additional concerns today:  Yes      Today's PHQ-2 Score:       4/18/2023     2:41 PM   PHQ-2 ( 1999 [employer])   Q1: Little interest or pleasure in doing things 0   Q2: Feeling down, depressed or hopeless 0   PHQ-2 Score 0       Have you ever done Advance Care Planning? (For example, a Health Directive, POLST, or a discussion with a medical provider or your loved ones about your wishes): No, advance care planning information given to patient to review.  Patient declined advance care planning discussion at this time.    Social History     Tobacco Use     Smoking status: Never     Passive exposure: Current     Smokeless tobacco: Never   Vaping Use     Vaping status: Not on file   Substance Use Topics     Alcohol use: Yes     Comment: socially              4/18/2023     2:42 PM   Alcohol Use   Prescreen: >3 drinks/day or >7 drinks/week? No   AUDIT SCORE  5         4/18/2023     2:42 PM   AUDIT - Alcohol Use Disorders Identification Test - Reproduced from the World Health Organization Audit 2001 (Second Edition)   1.  How often do you have a drink containing alcohol? 4 or more times a week   2.  How many drinks containing alcohol do you have on a typical day when you are drinking? 1 or 2   3.  How often do you have five or more drinks on one occasion? Less than monthly   4.  How often during the last year have you found that you were not able to stop drinking once you had started? Never   5.  How often during the last year have you failed to do what was normally expected of you because of drinking? Never   6.  How often during the last year have you needed a first drink in the morning to get yourself going after a heavy drinking session? Never   7.  How often during the last year have you had a feeling of guilt or remorse after drinking? Never   8.  How often during the last year have you been unable to remember what happened the night before because of your drinking? Never   9.   Have you or someone else been injured because of your drinking? No   10. Has a relative, friend, doctor or other health care worker been concerned about your drinking or suggested you cut down? No   TOTAL SCORE 5       Last PSA: No results found for: PSA    Reviewed orders with patient. Reviewed health maintenance and updated orders accordingly - Yes    Reviewed and updated as needed this visit by clinical staff   Tobacco  Allergies  Meds  Problems  Med Hx  Surg Hx  Fam Hx          Reviewed and updated as needed this visit by Provider   Tobacco  Allergies  Meds  Problems  Med Hx  Surg Hx  Fam Hx         Past Medical History:   Diagnosis Date     ADHD      Anxiety         Review of Systems   Constitutional: Negative for chills and fever.   HENT: Negative for congestion, ear pain, hearing loss and sore throat.    Eyes: Negative for pain and visual disturbance.   Respiratory: Negative for cough and shortness of breath.    Cardiovascular: Negative for chest pain, palpitations and peripheral edema.   Gastrointestinal: Negative for abdominal pain, constipation, diarrhea, heartburn, hematochezia and nausea.   Genitourinary: Negative for dysuria, frequency, genital sores, hematuria, impotence, penile discharge and urgency.   Musculoskeletal: Positive for arthralgias. Negative for joint swelling and myalgias.   Skin: Negative for rash.   Neurological: Negative for dizziness, weakness, headaches and paresthesias.   Psychiatric/Behavioral: Negative for mood changes. The patient is not nervous/anxious.        OBJECTIVE:   /80 (BP Location: Left arm, Patient Position: Sitting, Cuff Size: Adult Large)   Pulse 82   Temp 98.2  F (36.8  C) (Oral)   Resp 16   SpO2 98%     Physical Exam  GENERAL: healthy, alert and no distress  EYES: Eyes grossly normal to inspection, PERRL and conjunctivae and sclerae normal  HENT: ear canals and TM's normal, nose and mouth without ulcers or lesions  NECK: no adenopathy, no  asymmetry, masses, or scars and thyroid normal to palpation  RESP: lungs clear to auscultation - no rales, rhonchi or wheezes  CV: regular rate and rhythm, normal S1 S2, no S3 or S4, no murmur, click or rub, no peripheral edema and peripheral pulses strong  ABDOMEN: soft, nontender, no hepatosplenomegaly, no masses and bowel sounds normal  MS: no gross musculoskeletal defects noted, no edema. Of note, patient unable to sit due to back pain. Positive SLR L, negative KP/FADIR & log roll bilaterally.  SKIN: no suspicious lesions or rashes  NEURO: Normal strength and tone, mentation intact and speech normal  PSYCH: mentation appears normal, affect normal/bright      ASSESSMENT/PLAN:   Justin was seen today for physical and refill request.    Diagnoses and all orders for this visit:    Pain in left lumbar region of back  As this pain has been persistent for the last 5 years or so following the incident with a poor form dead lift, I think it is reasonable to proceed with an MRI of the lumbar spine.  Additionally will initiate patient on gabapentin today for nerve pain.  -     gabapentin (NEURONTIN) 100 MG capsule; Take 2 capsules (200 mg) by mouth 3 times daily for 90 days  -     MR LUMBAR SPINE W/O CONTRAST; Future    Generalized anxiety disorder with panic attacks  Providing patient with a very small dose of Xanax for use while flying, as he has struggled with anxiety on planes in the past  -     ALPRAZolam (XANAX) 1 MG tablet; Take 1 tablet (1 mg) by mouth 3 times daily as needed for anxiety (with flying)    BMI 31.0-31.9,adult  Patient pacifically was requesting information regarding healthy diet and exercise guidelines, however given the patient's chronic back pain and overall reassuring lab values, we discussed that at this time a rigorous diet and exercise plan is not necessarily the first priority.  We can really discuss this at a later time once we get his back pain under control.    Screening for lead  "exposure  -     Blood metal panel; Future  -     Lead Venous Blood Confirm; Future  -     Blood metal panel  -     Lead Venous Blood Confirm        Patient has been advised of split billing requirements and indicates understanding: Yes      COUNSELING:   Reviewed preventive health counseling, as reflected in patient instructions  Special attention given to:        Regular exercise       Healthy diet/nutrition       Occupational hazard heavy metal screening      BMI:   Estimated body mass index is 31.59 kg/m  as calculated from the following:    Height as of 3/7/23: 1.803 m (5' 11\").    Weight as of 3/7/23: 102.7 kg (226 lb 8 oz).   Weight management plan: Discussed healthy diet and exercise guidelines      He reports that he has never smoked. He has been exposed to tobacco smoke. He has never used smokeless tobacco.      TURNER BEDOYA DO  [facility]  "

## 2023-04-18 NOTE — PATIENT INSTRUCTIONS
Patient Education   Here is the plan from today's visit    1. Pain in left lumbar region of back  - gabapentin (NEURONTIN) 100 MG capsule; Take 2 capsules (200 mg) by mouth 3 times daily for 90 days  Dispense: 180 capsule; Refill: 2    2. Generalized anxiety disorder with panic attacks      3. BMI 31.0-31.9,adult    4. Screening for lead exposure  - Blood metal panel; Future  - Lead Venous Blood Confirm; Future    Please call or return to clinic if your symptoms don't go away.    Follow up plan  No follow-ups on file.    Thank you for coming to [facility] today.  Lab Testing:  **If you had lab testing today and your results are reassuring or normal they will be mailed to you or sent through [employer] within 7 days.   **If the lab tests need quick action we will call you with the results.  **If you are having labs done on a different day, please call [phone] to schedule at [facility] or [phone] for other [facility]patient [facility] locations. Labs do not offer walk-in appointments.  The phone number we will call with results is # [phone] (home) . If this is not the best number please call our clinic and change the number.  Medication Refills:  If you need any refills please call your pharmacy and they will contact us.   If you need to  your refill at a new pharmacy, please contact the new pharmacy directly. The new pharmacy will help you get your medications transferred faster.   Scheduling:  If you have any concerns about today's visit or wish to schedule another appointment please call our office during normal business hours [phone] (8-5:00 M-F). If you can no longer make a scheduled visit, please cancel via [employer] or call us to cancel.   If a referral was made to an [facility] specialty provider and you do not get a call from central scheduling, please refer to directions on your visit summary or call our office during normal business hours for assistance.   If a  "Mammogram was ordered for you at the [facility] call [phone] to schedule or change your appointment.  If you had an XRay/CT/Ultrasound/MRI ordered the number is [phone] to schedule or change your radiology appointment.   [facility] has limited ultrasound appointments available on Wednesdays, if you would like your ultrasound at [facility], please call [phone] to schedule.   Medical Concerns:  If you have urgent medical concerns please call [phone] at any time of the day.    TURNER BEDOYA, DO       What Is OMT (Osteopathic Manipulative Treatment)?    As part of their education, DOs (doctor of osteopathic medicine) receive special training in the musculoskeletal system (your nerves, bones and muscles).     OMT involves using the hands to diagnose, treat and prevent illness or injury.  Using OMT, your osteopathic physician will move your muscles and joints using techniques including stretching, gentle pressure and resistance.     OMT can help people of all ages and backgrounds. In addition to muscle or joint pain, it can be used to treat a variety of conditions such as asthma, sinus pain, migraines and carpal tunnel syndrome.     For more information, please visit [email or web address]    How to Schedule OMT :  Please make an appointment for \"OMT\" with the .  Please inform them you are scheduling for OMT with any DO provider. A list is provided below:     Verona Chase, DO  Guzman Noble, DO  Fabiola Baldwin, DO  Nan Fisher, DO  Maria G Morales, DO  Chrissy Alicea, DO  Yarely Jj, DO  Charli Jones, DO  Chuckie Lindquist, DO  Yulissa Hudson, DO    "

## 2023-04-18 NOTE — PROGRESS NOTES
"  Assessment & Plan     {Diag Picklist:988041}    {[facility] 2021 Documentation (Optional):029753}  {2021 E&M time (Optional):997224}  {Provider  Link to [facility] Help Grid :492706}     BMI:   Estimated body mass index is 31.59 kg/m  as calculated from the following:    Height as of 3/7/23: 1.803 m (5' 11\").    Weight as of 3/7/23: 102.7 kg (226 lb 8 oz).   {Weight Management Plan needed for ACO:479352}    {FOLLOW UP PLANS (Optional) Includes COVID19 Treatment Plan:477887}    No follow-ups on file.    TURNER BEDOYA DO [facility] Anderson is a 33 year old, presenting for the following health issues:  Physical (Pt is here for his annual check up. Pt reports x 5 years onging lower back pain. ) and Refill Request        4/18/2023     2:37 PM   Additional Questions   Roomed by Janel Reynaga MA   Accompanied by Self         4/18/2023     2:37 PM   Patient Reported Additional Medications   Patient reports taking the following new medications Yes     HPI     Back pain - Has been dealing with significant back pain for about 5 years now. Has gotten acutely worse over the past few weeks since he got diagnosed with COVID about a week.     Went to the ER a few days ago (Saturday) and got put on gabapentin at that time, which has been helping him sleep, has also helped the pain. Still has some bizarre discomfort - very painful when sitting but still painful when standing. Gabapentin has reduced pain by about 80%.    Has \"hot dagger\" pain right around left SI joint. Occasionally has had shooting pain and numbness down his L leg. While sleeping, pain running all the way down back of his thigh and into his calf and into his ankle.    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    Hx of anxiety with flying, previously had two " "alprazolam.    Weight concerns - difficulty with weight management. Hx of referral to nutritionist, strictly followed caloric restrictions and tracked calories for 3 straight months (under 2000 calories)    230lb, 5'11\" (roughly 31 BMI)       Review of Systems   Constitutional, HEENT, cardiovascular, pulmonary, gi and gu systems are negative, except as otherwise noted.      Objective    /80 (BP Location: Left arm, Patient Position: Sitting, Cuff Size: Adult Large)   Pulse 82   Temp 98.2  F (36.8  C) (Oral)   Resp 16   SpO2 98%   There is no height or weight on file to calculate BMI.  Physical Exam       "

## 2023-04-19 ENCOUNTER — THERAPY VISIT (OUTPATIENT)
Dept: PHYSICAL THERAPY | Facility: CLINIC | Age: 33
End: 2023-04-19
Payer: COMMERCIAL

## 2023-04-19 DIAGNOSIS — M54.50 PAIN IN LEFT LUMBAR REGION OF BACK: Primary | ICD-10-CM

## 2023-04-19 PROCEDURE — 97140 MANUAL THERAPY 1/> REGIONS: CPT | Mod: GP

## 2023-04-19 PROCEDURE — 97110 THERAPEUTIC EXERCISES: CPT | Mod: GP

## 2023-04-20 ENCOUNTER — ANCILLARY PROCEDURE (OUTPATIENT)
Dept: MRI IMAGING | Facility: CLINIC | Age: 33
End: 2023-04-20
Attending: FAMILY MEDICINE
Payer: COMMERCIAL

## 2023-04-20 DIAGNOSIS — M54.50 PAIN IN LEFT LUMBAR REGION OF BACK: ICD-10-CM

## 2023-04-20 PROCEDURE — 72148 MRI LUMBAR SPINE W/O DYE: CPT | Mod: GC | Performed by: RADIOLOGY

## 2023-04-21 LAB
ARSENIC BLD-MCNC: <10 UG/L
LEAD BLDV-MCNC: <2 UG/DL
MERCURY BLD-MCNC: <2.5 UG/L

## 2023-04-25 RX ORDER — ALPRAZOLAM 1 MG
1 TABLET ORAL 3 TIMES DAILY PRN
Qty: 2 TABLET | Refills: 0 | Status: SHIPPED | OUTPATIENT
Start: 2023-04-25

## 2023-05-03 DIAGNOSIS — M54.50 PAIN IN LEFT LUMBAR REGION OF BACK: Primary | ICD-10-CM

## 2023-05-08 NOTE — PROGRESS NOTES
"[facility] INTERVENTIONAL CONSULT    Date of visit: 5/9/2023    Reason for consultation:    Justin Morales is a 33 year old male who is seen in consultation today at the request of his primary care physician, Chuckie Lindquist.     Consultation and Evaluation for: PROCEDURE ONLY CONSULT  \"Severe back pain with worsening LE paresthesia going on for multiple years now; trauma related to weight lifting ~5 years ago\"    Review of Electronic Chart: Today I have also reviewed available medical information in the patient's medical record at [place] ([facility]), including relevant provider notes, laboratory work, and imaging.       Chief Complaint:    Chief Complaint   Patient presents with     Pain       Pain history:  Justin Morales is a 33 year old male who presents for initial evaluation of chief pain complaint of left sided low back pain with radiation to the left leg.     -The patient reports injuring his back about 5 years ago while doing a dead lift.  He felt a pop in his back and has had left-sided low back pain since that time.  Numbness or weakness of his legs  -About 2 months ago he began having radiating leg pain into the left posterior thigh calf and ankle.  Pain is worse with with sitting and any type of twisting movement.  -He works for the [employer] and has a very active job where he is crawling standing lifting and doing a lot of active things on a daily basis.  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  -He has never had surgery or injections.  -He is currently doing physical therapy and has gone to 3 sessions and is doing a home exercise program.  -He has never had a consult with neurosurgery to discuss surgical options.  -He is otherwise healthy and does not take any medications on a daily basis.  -He was started on gabapentin a couple weeks ago and this has been " extremely helpful in relieving some of his pain.  -Patient lives in [place] about a mile from the [facility].        Red Flags: The patient denies bowel or bladder incontinence, parasthesias, weakness, saddle anesthesia, unintentional weight loss, or fever/chills/sweats.       PAIN MEDICATIONS:  GABAPENTIN 200MGTID   ADVIL PRN    INJECTIONS/SURGICAL HISTORY:   NONE    IMAGING:  MRI LUMBAR SPINE 4/20/2023:   Findings: There are 5 lumbar-type vertebrae assumed for the purposes  of this dictation.  The tip of the conus medullaris is at L1.  Normal  lumbar vertebral alignment.  There is no significant disc height  narrowing.  No pathological marrow signal on the T1-weighted  sequences..     On a level by level basis:     T12-L1: No spinal canal or neuroforaminal stenosis.     L1-2: No spinal canal or neuroforaminal stenosis.     L2-3: Minimal degree of left facet hypertrophy. No spinal canal or  neuroforaminal stenosis.     L3-4: No spinal canal or neuroforaminal stenosis.     L4-5: Minimal degree of facet hypertrophy with the a small central  disc bulge.. There is no spinal canal or neural foraminal stenosis.  The central disc bulge abuts but does not distort the bilateral  descending L5 nerve roots (series 19, image 199).     L5-S1: Mild degree of facet hypertrophy. There is no spinal canal or  neural foraminal stenosis. Paracentral disc extrusion centered on the  left the subarticular zone abuts and distorts the left S1 nerve root  (series 19, image 223 and series 18, image 59).     Paraspinous tissues are within normal limits.                                                                  Impression: Subarticular disc extrusion at L5-S1 abuts and distorts  the descending left S1 nerve root, likely the cause of symptoms. This  could be amenable to percutaneous epidural steroid injection.    EMG/Testing:  NONE    LABS:   REVIEWED    Past Medical History:  Past Medical History:   Diagnosis Date     ADHD       Anxiety        Past Surgical History:  No past surgical history on file.    Medications:  Current Outpatient Medications   Medication Sig Dispense Refill     ALPRAZolam (XANAX) 1 MG tablet Take 1 tablet (1 mg) by mouth 3 times daily as needed for anxiety (with flying) 2 tablet 0     gabapentin (NEURONTIN) 100 MG capsule Take 2 capsules (200 mg) by mouth 3 times daily for 90 days 180 capsule 2     ibuprofen (ADVIL/MOTRIN) 600 MG tablet Take 1 tablet (600 mg) by mouth every 6 hours as needed for moderate pain (Patient not taking: Reported on 5/9/2023) 28 tablet 0       Allergies:   No Known Allergies    Family history:  No family history on file.    Physical Exam:  Vitals:    05/09/23 0818   BP: 114/78   Pulse: 62       GENERAL: Healthy, alert and no distress  EYES: Eyes grossly normal to inspection.  No discharge or erythema, or obvious scleral/conjunctival abnormalities.  RESP: No audible wheeze, cough, or visible cyanosis.  No visible retractions or increased work of breathing.    SKIN: Visible skin clear. No significant rash, abnormal pigmentation or lesions.  NEURO: Cranial nerves grossly intact.  Mentation and speech appropriate for age.  PSYCH: Mentation appears normal, affect normal/bright, judgement and insight intact, normal speech and appearance well-groomed.        Musculoskeletal exam:  Gait/Station/Posture:   Normal stance, arm swing, and stride; no antalgia or Trendelenburg  Normal bulk and tone. Unremarkable spinal curvature. ASIS heights even.      Lumbar spine:  Limited range of motion    Rotation/ext to right: painful    Rotation/ext to left: painful   Myofascial tenderness: None  Focal tenderness: No SI joint, gluteal, piriformis, GT, or IT tenderness  SLR: Positive on the left  Grady's maneuver: Negative    Neurologic exam:  CN:  Cranial nerves 2-12 are grossly intact  Motor Strength:  5/5 symmetric LE strength    Sensory:  (upper and lower extremities):   Light touch: normal    Allodynia:  absent    Hyperalgesia: absent       ASSESSMENT/PLAN:  The following recommendations were given to the patient. Diagnosis, treatment options, risks, benefits, and alternatives were discussed, and all questions were answered. The patient expressed understanding of the plan for management.       1. Lumbar radiculopathy  The patient has an approximately 2-month history of progressively worsening left-sided low back pain with radiation to the posterior thigh calf on that side.  He sustained an injury about where he felt a pop on the left side of his low back and has had point tenderness in his left lower back since that time.  The radiating left-sided leg pain is new.  Physical exam is significant for positive straight leg raise on the left side.  He does not have any neurologic deficits or weakness on that side.  MRI is significant for a disc protrusion at the L5-S1 level that is causing an impingement of the left L5 nerve root.  This does correlate with his pain.  I am recommending a left L5-S1 transforaminal epidural steroid injection.  He is already participating in physical therapy.  Briefly discussed that if things do not improve in the next 6 months he could consider a neurosurgery consult to discuss a microdiscectomy however this is not warranted at this time.  He was started on gabapentin by his primary care provider and this has been very helpful I recommend he continue this.    - PAIN INJECTION EVAL/TREAT/FOLLOW UP    2. Pain in left lumbar region of back    - Pain Management  Referral        MEDICATIONS:   No orders of the defined types were placed in this encounter.         - Continue other medications without change           FOLLOW UP: For injection.      BILLING TIME DOCUMENTATION:   The total TIME spent on this patient on the date of the encounter/appointment was 31 minutes.      TOTAL TIME includes:   Time spent preparing to see the patient (reviewing records and tests) - 4 min  Time spent  face to face (or over the phone) with the patient - 19 min  Time spent ordering tests, medications, procedures and referrals - 2 min  Time spent Referring and communicating with other healthcare professionals - 0 min  Time spent documenting clinical information in Epic - 6 min       DONNA CHONG MD   Pain Management

## 2023-05-09 ENCOUNTER — OFFICE VISIT (OUTPATIENT)
Dept: PALLIATIVE MEDICINE | Facility: CLINIC | Age: 33
End: 2023-05-09
Attending: FAMILY MEDICINE
Payer: COMMERCIAL

## 2023-05-09 ENCOUNTER — OFFICE VISIT (OUTPATIENT)
Dept: FAMILY MEDICINE | Facility: CLINIC | Age: 33
End: 2023-05-09
Payer: COMMERCIAL

## 2023-05-09 VITALS
HEIGHT: 71 IN | WEIGHT: 229 LBS | RESPIRATION RATE: 18 BRPM | SYSTOLIC BLOOD PRESSURE: 114 MMHG | HEART RATE: 77 BPM | DIASTOLIC BLOOD PRESSURE: 73 MMHG | BODY MASS INDEX: 32.06 KG/M2 | TEMPERATURE: 98.7 F | OXYGEN SATURATION: 100 %

## 2023-05-09 VITALS — SYSTOLIC BLOOD PRESSURE: 114 MMHG | HEART RATE: 62 BPM | DIASTOLIC BLOOD PRESSURE: 78 MMHG

## 2023-05-09 DIAGNOSIS — M54.50 PAIN IN LEFT LUMBAR REGION OF BACK: ICD-10-CM

## 2023-05-09 DIAGNOSIS — Z11.4 SCREENING FOR HIV (HUMAN IMMUNODEFICIENCY VIRUS): ICD-10-CM

## 2023-05-09 DIAGNOSIS — Z11.59 NEED FOR HEPATITIS C SCREENING TEST: ICD-10-CM

## 2023-05-09 DIAGNOSIS — M54.16 LUMBAR RADICULOPATHY: Primary | ICD-10-CM

## 2023-05-09 DIAGNOSIS — M54.50 PAIN IN LEFT LUMBAR REGION OF BACK: Primary | ICD-10-CM

## 2023-05-09 PROCEDURE — 36415 COLL VENOUS BLD VENIPUNCTURE: CPT

## 2023-05-09 PROCEDURE — 99213 OFFICE O/P EST LOW 20 MIN: CPT | Mod: GC

## 2023-05-09 PROCEDURE — 86803 HEPATITIS C AB TEST: CPT

## 2023-05-09 PROCEDURE — 87389 HIV-1 AG W/HIV-1&-2 AB AG IA: CPT

## 2023-05-09 PROCEDURE — 99203 OFFICE O/P NEW LOW 30 MIN: CPT | Performed by: ANESTHESIOLOGY

## 2023-05-09 ASSESSMENT — PAIN SCALES - GENERAL: PAINLEVEL: MILD PAIN (3)

## 2023-05-09 NOTE — PROGRESS NOTES
Assessment & Plan     Pain in left lumbar region of back  Patient has had a very good response to the gabapentin that was started during our last visit.  Continuing to follow-up with physical therapy and is in agreement to proceed with the pain clinic recommendation for L5/S1 steroid injection.  Patient was provided with counseling and recommendations on physical activity, stating that  continuing to be physically active with moderate to light activity is fully appropriate, recommended patient avoid heavy lifting (most specifically anything in excess of 40 to 50 pounds applying excess pressure on his back) we will have patient follow-up in about 3 months for a standard physical and to see how his back pain is progressing following the steroid injection and continuing on gabapentin.  We will expect patient will need to be on gabapentin for trial 6 to 12 months pending improvement of back pain.    Screening for HIV (human immunodeficiency virus)  - HIV Screening    Need for hepatitis C screening test   - Hepatitis C Screen Reflex to HCV RNA Quant and Genotype      Return in about 3 months (around 8/9/2023).    TURNER BEDOYA DO  [facility] JOSEPH Anderson is a 33 year old, presenting for the following health issues:  RECHECK (Back pain)        5/9/2023     2:24 PM   Additional Questions   Roomed by shaylee   Accompanied by self     HPI     Back pain - Had referral this morning to pain clinic, they totally agree with getting a steroid injection. Supposed to get a call today about scheduling either this week or next. Can take up to 12mo to heal. Gabapentin has been doing well for pain control. Has gotten some mixed messages regarding recommendations for exercises/pain management. Still is seeing PT (next appt tomorrow). Wondering about bike riding, other activities.    Review of Systems   Constitutional, HEENT, cardiovascular, pulmonary, gi and gu systems are negative, except as  "otherwise noted.      Objective    /73   Pulse 77   Temp 98.7  F (37.1  C)   Resp 18   Ht 1.803 m (5' 11\")   Wt 103.9 kg (229 lb)   SpO2 100%   BMI 31.94 kg/m    Body mass index is 31.94 kg/m .  Physical Exam  Vitals reviewed.   Constitutional:       General: He is not in acute distress.     Appearance: Normal appearance. He is normal weight. He is not ill-appearing.   HENT:      Head: Normocephalic and atraumatic.      Right Ear: External ear normal.      Left Ear: External ear normal.      Nose: Nose normal.   Eyes:      Extraocular Movements: Extraocular movements intact.      Conjunctiva/sclera: Conjunctivae normal.   Cardiovascular:      Rate and Rhythm: Normal rate and regular rhythm.      Pulses: Normal pulses.      Heart sounds: Normal heart sounds. No murmur heard.     No friction rub. No gallop.   Pulmonary:      Effort: Pulmonary effort is normal. No respiratory distress.      Breath sounds: Normal breath sounds. No stridor. No wheezing.   Abdominal:      General: Abdomen is flat. Bowel sounds are normal. There is no distension.      Palpations: Abdomen is soft.      Tenderness: There is no abdominal tenderness.   Musculoskeletal:         General: Normal range of motion.      Cervical back: Normal range of motion.   Skin:     General: Skin is warm and dry.   Neurological:      General: No focal deficit present.      Mental Status: He is alert and oriented to person, place, and time. Mental status is at baseline.   Psychiatric:         Mood and Affect: Mood normal.         Behavior: Behavior normal.         Thought Content: Thought content normal.         Judgment: Judgment normal.            "

## 2023-05-10 ENCOUNTER — THERAPY VISIT (OUTPATIENT)
Dept: PHYSICAL THERAPY | Facility: CLINIC | Age: 33
End: 2023-05-10
Payer: COMMERCIAL

## 2023-05-10 DIAGNOSIS — M54.50 PAIN IN LEFT LUMBAR REGION OF BACK: Primary | ICD-10-CM

## 2023-05-10 LAB
HCV AB SERPL QL IA: NONREACTIVE
HIV 1+2 AB+HIV1 P24 AG SERPL QL IA: NONREACTIVE

## 2023-05-10 PROCEDURE — 97110 THERAPEUTIC EXERCISES: CPT | Mod: GP

## 2023-05-10 PROCEDURE — 97112 NEUROMUSCULAR REEDUCATION: CPT | Mod: GP

## 2023-05-11 NOTE — PATIENT INSTRUCTIONS
Patient Education   Here is the plan from today's visit    1. Pain in left lumbar region of back    2. Screening for HIV (human immunodeficiency virus)  - HIV Screening    3. Need for hepatitis C screening test  - Hepatitis C Screen Reflex to HCV RNA Quant and Genotype      Please call or return to clinic if your symptoms don't go away.    Follow up plan  Return in about 3 months (around 8/9/2023).    Thank you for coming to [facility] today.  Lab Testing:  **If you had lab testing today and your results are reassuring or normal they will be mailed to you or sent through [employer] within 7 days.   **If the lab tests need quick action we will call you with the results.  **If you are having labs done on a different day, please call [phone] to schedule at [facility] or [phone] for other [facility] Outpatient Lab locations. Labs do not offer walk-in appointments.  The phone number we will call with results is # [phone] (home) . If this is not the best number please call our clinic and change the number.  Medication Refills:  If you need any refills please call your pharmacy and they will contact us.   If you need to  your refill at a new pharmacy, please contact the new pharmacy directly. The new pharmacy will help you get your medications transferred faster.   Scheduling:  If you have any concerns about today's visit or wish to schedule another appointment please call our office during normal business hours [phone] (8-5:00 M-F). If you can no longer make a scheduled visit, please cancel via [employer] or call us to cancel.   If a referral was made to an [facility] specialty provider and you do not get a call from central scheduling, please refer to directions on your visit summary or call our office during normal business hours for assistance.   If a Mammogram was ordered for you at the Breast Center call [phone] to schedule or change your appointment.  If you had an  XRay/CT/Ultrasound/MRI ordered the number is [phone] to schedule or change your radiology appointment.   [facility] has limited ultrasound appointments available on Wednesdays, if you would like your ultrasound at [facility], please call [phone] to schedule.   Medical Concerns:  If you have urgent medical concerns please call [phone] at any time of the day.    TURNER BEDOYA, DO

## 2023-05-26 NOTE — PROGRESS NOTES
[facility] - Procedure Note    Date of Service: 5/31/2023    Procedure performed: LEFT L5 transforaminal epidural steroid injection with fluoroscopic guidance  Diagnosis: Lumbar spondylosis; Lumbar radiculitis/radiculopathy  : Zahida Ramey MD & Dalton Wolff MD (pain fellow)   Anesthesia: none    Indications: Justin Morales is a 33 year old male who is seen at the request of myself (interventional consult) for lumbar transforaminal epidural steroid injection. The patient describes low back pain with radiation into the left posterior thigh and calf down to the ankle. The patient has been exhibiting symptoms consistent with lumbar intraspinal inflammation and radiculopathy. Symptoms have been persistent, disabling, and intermittently severe. The patient reports minimal improvement with conservative treatment, including PT and medications.    LUMBAR MRI was done on 4/20/2023 which showed:   Findings: There are 5 lumbar-type vertebrae assumed for the purposes  of this dictation.  The tip of the conus medullaris is at L1.  Normal  lumbar vertebral alignment.  There is no significant disc height  narrowing.  No pathological marrow signal on the T1-weighted  sequences..     On a level by level basis:     T12-L1: No spinal canal or neuroforaminal stenosis.     L1-2: No spinal canal or neuroforaminal stenosis.     L2-3: Minimal degree of left facet hypertrophy. No spinal canal or  neuroforaminal stenosis.     L3-4: No spinal canal or neuroforaminal stenosis.     L4-5: Minimal degree of facet hypertrophy with the a small central  disc bulge.. There is no spinal canal or neural foraminal stenosis.  The central disc bulge abuts but does not distort the bilateral  descending L5 nerve roots (series 19, image 199).     L5-S1: Mild degree of facet hypertrophy. There is no spinal canal or  neural foraminal stenosis. Paracentral disc extrusion centered on the  left the subarticular zone  abuts and distorts the left S1 nerve root  (series 19, image 223 and series 18, image 59).     Paraspinous tissues are within normal limits.                                                                      Impression: Subarticular disc extrusion at L5-S1 abuts and distorts  the descending left S1 nerve root, likely the cause of symptoms. This  could be amenable to percutaneous epidural steroid injection.    Allergies:    No Known Allergies     Vitals:  /75   Pulse 57   SpO2 97%     Review of Systems: The patient denies recent fever, chills, illness, use of antibiotics or anticoagulants. All other 10-point review of systems negative.     Procedure: The procedure and risks were explained, and informed written consent was obtained from the patient. Risks include but are not limited to: infection, bleeding, increased pain, and damage to soft tissue, nerve, muscle, and vasculature structures. After getting informed consent, patient was brought into the procedure suite and was placed in a prone position on the procedure table. A Pause for the Cause was performed. Patient was prepped and draped in sterile fashion.     After identifying the left L5 neuroforamen, the C-arm was rotated to a left lateral oblique angle.  A total of 4.5 mL of Lidocaine 1% was used to anesthetize the skin and the needle track at a skin entry site coaxial with the fluoroscopy beam, and overriding the superior aspect of the neuroforamen.  A 22 gauge 5 inch spinal needle was advanced under intermittent fluoroscopy until it entered the foramen superiorly.    The position was then inspected from anteroposterior and lateral views, and the needle adjusted appropriately.  After negative aspiration, a total of 1 mL of Omnipaque-300 was injected using static and continuous fluoroscopy confirming appropriate position, with spread along the nerve root sheath and into the epidural space, with no intravascular or intrathecal uptake. 0 mL of  Omnipaque-300 was wasted.    1mL of 1% lidocaine with 20 mg of dexamethasone was injected.  The needle was removed. Hemostasis was achieved, the area was cleaned, and bandaids were placed when appropriate. Images were saved to PACS.    The patient tolerated the procedure well, and was taken to the recovery room, and there was no evidence of procedural complications. No new sensory or motor deficits were noted following the procedure. The patient was stable and able to ambulate on discharge home. Post-procedure instructions were provided.     Pre-procedure pain score: 4/10 in the back, 4/10 in the leg  Post-procedure pain score: 4/10 in the back, 4/10 in the leg    Assessment/Plan: Justin Morales is a 33 year old male s/p Left L5 transforaminal epidural steroid injection today for lumbar spondylosis, radiculitis/radiculopathy.     1. Following today's procedure, the patient was advised to contact the Pain Management Center for any of the following:   Fever, chills, or night sweats   New onset of pain, numbness, or weakness   Any questions/concerns regarding the procedure  If unable to contact the Pain Center, the patient was instructed to go to a local Emergency Room for any complications.   2. The patient should follow-up with the referring provider in 2 weeks for post-procedure evaluation.      DONNA CHONG MD   Pain Management

## 2023-05-31 ENCOUNTER — RADIOLOGY INJECTION OFFICE VISIT (OUTPATIENT)
Dept: PALLIATIVE MEDICINE | Facility: CLINIC | Age: 33
End: 2023-05-31
Payer: COMMERCIAL

## 2023-05-31 VITALS — SYSTOLIC BLOOD PRESSURE: 119 MMHG | DIASTOLIC BLOOD PRESSURE: 72 MMHG | HEART RATE: 53 BPM | OXYGEN SATURATION: 99 %

## 2023-05-31 DIAGNOSIS — M54.16 LUMBAR RADICULOPATHY: Primary | ICD-10-CM

## 2023-05-31 PROCEDURE — 64483 NJX AA&/STRD TFRM EPI L/S 1: CPT | Mod: LT | Performed by: ANESTHESIOLOGY

## 2023-05-31 RX ORDER — DEXAMETHASONE SODIUM PHOSPHATE 10 MG/ML
10 INJECTION, SOLUTION INTRAMUSCULAR; INTRAVENOUS ONCE
Status: COMPLETED | OUTPATIENT
Start: 2023-05-31 | End: 2023-05-31

## 2023-05-31 RX ADMIN — DEXAMETHASONE SODIUM PHOSPHATE 10 MG: 10 INJECTION, SOLUTION INTRAMUSCULAR; INTRAVENOUS at 09:59

## 2023-05-31 NOTE — PATIENT INSTRUCTIONS
[facility] Procedure Discharge Instructions    Today you saw:   Dr. Zahida Ramey     Your procedure:  Epidural steroid injection      Medications used:  Lidocaine (anesthetic)    Dexamethasone (steroid)   Omnipaque (contrast)   Saline       Be cautious when walking as numbness and/or weakness in the legs may occur up to 6-8 hours after the procedure due to effect of the local anesthetic  Do not drive for 6 hours. The effect of the local anesthetic could slow your reflexes.   Avoid strenuous activity for the first 24 hours. You may resume your regular activities after that.   You may shower, however avoid swimming, tub baths or hot tubs for 24 hours following your procedure  You may have a mild to moderate increase in pain for several days following the injection.    You may use ice packs for 10-15 minutes, 3 to 4 times a day at the injection site for comfort  Do not use heat to painful areas for 6 to 8 hours. This will give the local anesthetic time to wear off and prevent you from accidentally burning your skin.  Unless you have been directed to avoid the use of anti-inflammatory medications (NSAIDS-ibuprofen, Aleve, Motrin), you may use these medications or Tylenol for pain control if needed.   Possible side effects of steroids that you may experience include flushing, elevated blood pressure, increased appetite, mild headaches and restlessness.  All of these symptoms will get better with time.  It may take up to 14 days for the steroid medication to start working although you may feel the effect as early as a few days after the procedure.   Follow up with your referring provider (Dr Chase) in 2-3 weeks    If you experience any of the following, call the pain center line during work hours at [phone] or on-call physician after hours at [phone]:  -Fever over 100 degree F  -Swelling, bleeding, redness, drainage, warmth at the injection site  -Progressive weakness or numbness in your legs  or arms  -Loss of bowel or bladder function  -Unusual headache that is not relieved by Tylenol or your regular headache medication  -Unusual new onset of pain that is not improving

## 2023-05-31 NOTE — NURSING NOTE
Discharge Information    IV Discontiued Time:  NA    Amount of Fluid Infused:  NA    Discharge Criteria = When patient returns to baseline or as per MD order    Consciousness:  Pt is fully awake    Circulation:  BP +/- 20% of pre-procedure level    Respiration:  Patient is able to breathe deeply    O2 Sat:  Patient is able to maintain O2 Sat >92% on room air    Activity:  Moves 4 extremities on command    Ambulation:  Patient is able to stand and walk or stand and pivot into wheelchair    Dressing:  Clean/dry or No Dressing    Notes:   Discharge instructions and AVS given to patient    Patient meets criteria for discharge?  YES    Admitted to PCU?  No    Responsible adult present to accompany patient home?  Yes    Signature/Title:    Yulissa Leonard RN  RN Care Coordinator  [facility]

## 2023-06-07 ENCOUNTER — THERAPY VISIT (OUTPATIENT)
Dept: PHYSICAL THERAPY | Facility: CLINIC | Age: 33
End: 2023-06-07
Payer: COMMERCIAL

## 2023-06-07 DIAGNOSIS — M54.50 PAIN IN LEFT LUMBAR REGION OF BACK: Primary | ICD-10-CM

## 2023-06-07 PROCEDURE — 97110 THERAPEUTIC EXERCISES: CPT | Mod: GP | Performed by: PHYSICAL THERAPIST

## 2023-06-07 PROCEDURE — 97530 THERAPEUTIC ACTIVITIES: CPT | Mod: GP | Performed by: PHYSICAL THERAPIST

## 2023-06-07 NOTE — PROGRESS NOTES
DISCHARGE REPORT    Progress reporting period is from start of care to 6/7/2023.       SUBJECTIVE  Subjective changes noted by patient:  Feels 85% improved, has resumed walking and biking and is compliant with HEP.  [place] lifting body mechanics today in clinic.       Current pain level is 2/10  .          Changes in function:  Yes, biking, walking and HEP are going well.  Can sit through workday with lumbar support and breaks to move around as needed.  Adverse reaction to treatment or activity: None    OBJECTIVE  Changes noted in objective findings:  The objective findings below are from DOS 6/7/2023.        ASSESSMENT/PLAN  STG/LTGs have been met or progress has been made towards goals:  Yes, sitting much improved  Assessment of Progress: The patient's condition is improving.  Self Management Plans:  Patient is independent in a home treatment program.  Justin continues to require the following intervention to meet STG and LTG's:  PT intervention is no longer required to meet STG/LTG.    Recommendations:  This patient is ready to be discharged from therapy and continue their home treatment program.    Please refer to the daily flowsheet for treatment today, total treatment time and time spent performing 1:1 timed codes.

## 2024-01-17 ENCOUNTER — TELEPHONE (OUTPATIENT)
Dept: OPHTHALMOLOGY | Facility: CLINIC | Age: 34
End: 2024-01-17
Payer: COMMERCIAL

## 2024-01-18 ENCOUNTER — OFFICE VISIT (OUTPATIENT)
Dept: OPHTHALMOLOGY | Facility: CLINIC | Age: 34
End: 2024-01-18
Payer: COMMERCIAL

## 2024-01-18 DIAGNOSIS — H52.11 MYOPIC ASTIGMATISM OF RIGHT EYE: Primary | ICD-10-CM

## 2024-01-18 DIAGNOSIS — H52.201 MYOPIC ASTIGMATISM OF RIGHT EYE: Primary | ICD-10-CM

## 2024-01-18 PROCEDURE — 92004 COMPRE OPH EXAM NEW PT 1/>: CPT | Performed by: OPTOMETRIST

## 2024-01-18 PROCEDURE — 92015 DETERMINE REFRACTIVE STATE: CPT | Performed by: OPTOMETRIST

## 2024-01-18 RX ORDER — CETIRIZINE HYDROCHLORIDE 5 MG/1
5 TABLET ORAL DAILY
COMMUNITY

## 2024-01-18 ASSESSMENT — SLIT LAMP EXAM - LIDS
COMMENTS: NORMAL
COMMENTS: NORMAL

## 2024-01-18 ASSESSMENT — EXTERNAL EXAM - RIGHT EYE: OD_EXAM: NORMAL

## 2024-01-18 ASSESSMENT — CONF VISUAL FIELD
OD_NORMAL: 1
OS_SUPERIOR_TEMPORAL_RESTRICTION: 0
OS_SUPERIOR_NASAL_RESTRICTION: 0
OS_INFERIOR_NASAL_RESTRICTION: 0
OD_SUPERIOR_NASAL_RESTRICTION: 0
OD_INFERIOR_TEMPORAL_RESTRICTION: 0
OS_INFERIOR_TEMPORAL_RESTRICTION: 0
OD_INFERIOR_NASAL_RESTRICTION: 0
OD_SUPERIOR_TEMPORAL_RESTRICTION: 0
OS_NORMAL: 1
METHOD: COUNTING FINGERS

## 2024-01-18 ASSESSMENT — REFRACTION_MANIFEST
OD_CYLINDER: +0.75
OD_SPHERE: -0.75
OD_AXIS: 092
OS_SPHERE: PLANO
OS_CYLINDER: SPHERE

## 2024-01-18 ASSESSMENT — CUP TO DISC RATIO
OS_RATIO: 0.2
OD_RATIO: 0.2

## 2024-01-18 ASSESSMENT — VISUAL ACUITY
OS_SC+: -1
METHOD: SNELLEN - LINEAR
OD_SC+: -1
OS_SC: J1+
OS_SC: 20/15
OD_SC: 20/30
OD_PH_SC: 20/20
OD_PH_SC+: -1
OD_SC: J1-2

## 2024-01-18 ASSESSMENT — TONOMETRY
OS_IOP_MMHG: 15
OD_IOP_MMHG: 15
IOP_METHOD: ICARE

## 2024-01-18 ASSESSMENT — EXTERNAL EXAM - LEFT EYE: OS_EXAM: NORMAL

## 2024-01-18 NOTE — PROGRESS NOTES
History  HPI       COMPREHENSIVE EYE EXAM    Associated symptoms include dryness.  Negative for eye pain and discharge.  Treatments tried include artificial tears.  Pain was noted as 0/10. Additional comments: Comprehensive exam             Comments    Reading up close not as clear and driving at night not as sharp more with right eye> left eye.   States that he was told in the past right eye has some astigmatism and questions if this is focus blurry or ghosting.   Would like to consider glasses today.     Heather Michel, COT COT 3:00 PM January 18, 2024                 Last edited by Greg Jorge, OD on 1/18/2024  3:32 PM.          Assessment/Plan  (H52.201,  H52.11) Myopic astigmatism of right eye  (primary encounter diagnosis)  Comment: Myopic astigmatism right eye  Plan: REFRACTION         Educated patient on condition and clinical findings. Dispensed spectacle prescription for as-needed wear. Monitor annually.    Ocular health normal on examination today.    Return to clinic in 1 year for comprehensive eye exam.    Complete documentation of historical and exam elements from today's encounter can  be found in the full encounter summary report (not reduplicated in this progress  note). I personally obtained the chief complaint(s) and history of present illness. I  confirmed and edited as necessary the review of systems, past medical/surgical  history, family history, social history, and examination findings as documented by  others; and I examined the patient myself. I personally reviewed the relevant tests,  images, and reports as documented above. I formulated and edited as necessary the  assessment and plan and discussed the findings and management plan with the  patient and family.    Greg Jorge, ELISE, FAAO

## 2024-01-18 NOTE — NURSING NOTE
Chief Complaints and History of Present Illnesses   Patient presents with    COMPREHENSIVE EYE EXAM     Comprehensive exam     Chief Complaint(s) and History of Present Illness(es)       COMPREHENSIVE EYE EXAM              Associated symptoms: dryness.  Negative for eye pain and discharge    Treatments tried: artificial tears    Pain scale: 0/10    Comments: Comprehensive exam              Comments    Reading up close not a clear and driving at night not as sharp more with right eye> left eye.   States that he was told in the past right eye has some astigmatism and questions if this is focus blurry or ghosting.   Would like to consider glasses today.     Heather Michel, COT COT 3:00 PM January 18, 2024

## 2024-04-29 ENCOUNTER — OFFICE VISIT (OUTPATIENT)
Dept: FAMILY MEDICINE | Facility: CLINIC | Age: 34
End: 2024-04-29
Payer: COMMERCIAL

## 2024-04-29 VITALS
TEMPERATURE: 98.6 F | BODY MASS INDEX: 32.9 KG/M2 | WEIGHT: 235 LBS | SYSTOLIC BLOOD PRESSURE: 116 MMHG | DIASTOLIC BLOOD PRESSURE: 76 MMHG | HEIGHT: 71 IN | HEART RATE: 62 BPM | OXYGEN SATURATION: 98 %

## 2024-04-29 DIAGNOSIS — Z00.00 ROUTINE GENERAL MEDICAL EXAMINATION AT A HEALTH CARE FACILITY: ICD-10-CM

## 2024-04-29 DIAGNOSIS — M51.369 BULGING OF LUMBAR INTERVERTEBRAL DISC: Primary | ICD-10-CM

## 2024-04-29 DIAGNOSIS — L50.1 CHRONIC IDIOPATHIC URTICARIA: ICD-10-CM

## 2024-04-29 PROCEDURE — 99214 OFFICE O/P EST MOD 30 MIN: CPT | Mod: 25

## 2024-04-29 PROCEDURE — 99395 PREV VISIT EST AGE 18-39: CPT | Mod: GC

## 2024-04-29 RX ORDER — GABAPENTIN 100 MG/1
100 CAPSULE ORAL 2 TIMES DAILY
Qty: 180 CAPSULE | Refills: 1 | Status: SHIPPED | OUTPATIENT
Start: 2024-04-29

## 2024-04-29 SDOH — HEALTH STABILITY: PHYSICAL HEALTH: ON AVERAGE, HOW MANY DAYS PER WEEK DO YOU ENGAGE IN MODERATE TO STRENUOUS EXERCISE (LIKE A BRISK WALK)?: 6 DAYS

## 2024-04-29 ASSESSMENT — SOCIAL DETERMINANTS OF HEALTH (SDOH): HOW OFTEN DO YOU GET TOGETHER WITH FRIENDS OR RELATIVES?: THREE TIMES A WEEK

## 2024-04-29 NOTE — PATIENT INSTRUCTIONS
Preventive Care Advice   This is general advice given by our system to help you stay healthy. However, your care team may have specific advice just for you. Please talk to your care team about your preventive care needs.  Nutrition  Eat 5 or more servings of fruits and vegetables each day.  Try wheat bread, brown rice and whole grain pasta (instead of white bread, rice, and pasta).  Get enough calcium and vitamin D. Check the label on foods and aim for 100% of the RDA (recommended daily allowance).  Lifestyle  Exercise at least 150 minutes each week   (30 minutes a day, 5 days a week).  Do muscle strengthening activities 2 days a week. These help control your weight and prevent disease.  No smoking.  Wear sunscreen to prevent skin cancer.  Have a dental exam and cleaning every 6 months.  Yearly exams  See your health care team every year to talk about:  Any changes in your health.  Any medicines your care team has prescribed.  Preventive care, family planning, and ways to prevent chronic diseases.  Shots (vaccines)   HPV shots (up to age 26), if you've never had them before.  Hepatitis B shots (up to age 59), if you've never had them before.  COVID-19 shot: Get this shot when it's due.  Flu shot: Get a flu shot every year.  Tetanus shot: Get a tetanus shot every 10 years.  Pneumococcal, hepatitis A, and RSV shots: Ask your care team if you need these based on your risk.  Shingles shot (for age 50 and up).  General health tests  Diabetes screening:  Starting at age 35, Get screened for diabetes at least every 3 years.  If you are younger than age 35, ask your care team if you should be screened for diabetes.  Cholesterol test: At age 39, start having a cholesterol test every 5 years, or more often if advised.  Bone density scan (DEXA): At age 50, ask your care team if you should have this scan for osteoporosis (brittle bones).  Hepatitis C: Get tested at least once in your life.  STIs (sexually transmitted  infections)  Before age 24: Ask your care team if you should be screened for STIs.  After age 24: Get screened for STIs if you're at risk. You are at risk for STIs (including HIV) if:  You are sexually active with more than one person.  You don't use condoms every time.  You or a partner was diagnosed with a sexually transmitted infection.  If you are at risk for HIV, ask about PrEP medicine to prevent HIV.  Get tested for HIV at least once in your life, whether you are at risk for HIV or not.  Cancer screening tests  Cervical cancer screening: If you have a cervix, begin getting regular cervical cancer screening tests at age 21. Most people who have regular screenings with normal results can stop after age 65. Talk about this with your provider.  Breast cancer scan (mammogram): If you've ever had breasts, begin having regular mammograms starting at age 40. This is a scan to check for breast cancer.  Colon cancer screening: It is important to start screening for colon cancer at age 45.  Have a colonoscopy test every 10 years (or more often if you're at risk) Or, ask your provider about stool tests like a FIT test every year or Cologuard test every 3 years.  To learn more about your testing options, visit: https://www.[employer]/789180.pdf.  For help making a decision, visit: https://bit.ly/vw21436.  Prostate cancer screening test: If you have a prostate and are age 55 to 69, ask your provider if you would benefit from a yearly prostate cancer screening test.  Lung cancer screening: If you are a current or former smoker age 50 to 80, ask your care team if ongoing lung cancer screenings are right for you.  For informational purposes only. Not to replace the advice of your health care provider. Copyright   2023 [employer]. All rights reserved. Clinically reviewed by the [facility] Transitions Program. [employer] [id number] - REV 01/24.

## 2024-04-29 NOTE — PROGRESS NOTES
"Preventive Care Visit  [facility] ASAADALANNA ChanDO Isaias, Family Medicine  Apr 29, 2024      Assessment & Plan     Routine general medical examination at a health care facility    Bulging of lumbar intervertebral disc  History of bulging lumbar disc L5/S1 previously managed with nerve root steroid injection approximately a year ago with 10 months of excellent pain relief.  Recently had return of similar, but much more mild pain, following a flight.  Has since improved with mild exercise.  Encourage patient continue with his excellent been his routine, avoiding whole-body lifts as able.  For now we will provide patient with a refill on gabapentin, as this been very helpful in managing his sciatic pain that comes intermittently with his intervertebral disc disease, will also provide a repeat physical therapy referral for him continued maintenance of pain control/good lifting technique.  - gabapentin (NEURONTIN) 100 MG capsule; Take 1 capsule (100 mg) by mouth 2 times daily  - Physical Therapy  Referral; Future    Chronic idiopathic urticaria  Patient describes a new onset, chronic urticarial rash effectively suppressed with cetirizine that began approximately 9 days after COVID shot in the fall of this past year.  For now, patient not experiencing symptoms while on daily cetirizine.  Plan will be to continue with OTC medication for now.  Encouraged him to reach back out if symptoms change or worsen.      BMI  Estimated body mass index is 32.78 kg/m  as calculated from the following:    Height as of this encounter: 1.803 m (5' 11\").    Weight as of this encounter: 106.6 kg (235 lb).   Weight management plan: Discussed healthy diet and exercise guidelines    Counseling  Appropriate preventive services were discussed with this patient, including applicable screening as appropriate for fall prevention, nutrition, physical activity, Tobacco-use cessation, weight loss and cognition.  " Checklist reviewing preventive services available has been given to the patient.  Reviewed patient's diet, addressing concerns and/or questions.     The longitudinal plan of care for the diagnosis(es)/condition(s) as documented were addressed during this visit. Due to the added complexity in care, I will continue to support Justin in the subsequent management and with ongoing continuity of care.  Return in about 53 weeks (around 5/5/2025) for Annual Wellness Visit.    Subjective   Justin is a 34 year old, presenting for the following:  Physical (Annual wellness exam )        4/29/2024    10:23 AM   Additional Questions   Roomed by Lyrik   Accompanied by self         4/29/2024    Information    services provided? No        Health Care Directive  Patient does not have a Health Care Directive or Living Will: Discussed advance care planning with patient; information given to patient to review.    HPI    Back pain - Had 10 months that were excellent following PT & injection. Tweaked his back again a few weeks ago on a plane but it was a bit different than the seriously significant back pain with sciatica that he was dealing with last year. Has been doing the gym 6 days a week, lifts weights 3 days a week and has been very diligent with good lifting technique. Current pain without the same sciatic symptoms as last year. Sharp pain right at L5/S1.    COVID shot reaction - Back in Nov did get a fever, and then about 9 days later broke out into hives. Ever since then, has had persistent hives, itchiness that has been well managed. No hx of that type of allergic rxn and no skin changes or itchiness while on the cetirizine. Fully eliminated/investigated detergents, clothes, workplace. No dyspnea, swelling.         4/29/2024   General Health   How would you rate your overall physical health? (!) FAIR   Feel stress (tense, anxious, or unable to sleep) Not at all         4/29/2024   Nutrition   Three or  more servings of calcium each day? Yes   Diet: Vegetarian/vegan   How many servings of fruit and vegetables per day? 4 or more   How many sweetened beverages each day? 0-1         4/29/2024   Exercise   Days per week of moderate/strenous exercise 6 days         4/29/2024   Social Factors   Frequency of gathering with friends or relatives Three times a week   Worry food won't last until get money to buy more No   Food not last or not have enough money for food? No   Do you have housing?  Yes   Are you worried about losing your housing? No   Lack of transportation? No   Unable to get utilities (heat,electricity)? No         4/29/2024   Dental   Dentist two times every year? Yes         4/29/2024   TB Screening   Were you born outside of the [place]? No           Today's PHQ-2 Score:       1/18/2024     3:00 PM   PHQ-2 ( 1999 [employer])   Q1: Little interest or pleasure in doing things 0   Q2: Feeling down, depressed or hopeless 0   PHQ-2 Score 0         4/29/2024   Substance Use   Alcohol more than 3/day or more than 7/wk No   Do you use any other substances recreationally? (!) ALCOHOL     Social History     Tobacco Use    Smoking status: Never     Passive exposure: Current    Smokeless tobacco: Never   Substance Use Topics    Alcohol use: Yes     Comment: socially     Drug use: Never           4/29/2024   STI Screening   New sexual partner(s) since last STI/HIV test? No         4/29/2024   Contraception/Family Planning   Questions about contraception or family planning No        Reviewed and updated as needed this visit by Provider                    Past Medical History:   Diagnosis Date    ADHD     Anxiety      No past surgical history on file.      Review of Systems  Constitutional, HEENT, cardiovascular, pulmonary, gi and gu systems are negative, except as otherwise noted.     Objective    Exam  /76 (BP Location: Left arm, Patient Position: Sitting, Cuff Size: Adult Large)   Pulse 62   Temp 98.6  F (37  C) (Oral)  "  Ht 1.803 m (5' 11\")   Wt 106.6 kg (235 lb)   SpO2 98%   BMI 32.78 kg/m     Estimated body mass index is 32.78 kg/m  as calculated from the following:    Height as of this encounter: 1.803 m (5' 11\").    Weight as of this encounter: 106.6 kg (235 lb).    Physical Exam  Vitals reviewed.  Constitutional: awake, alert, cooperative, in NAD  Eyes: Sclera without jaundice or injection, PERRLA, EOM intact   HENT: NCAT without lesions, oral cavity with MMM, intact dentition  Respiratory: Lungs CTAB without crackles, wheezing, rales, or increased work of breathing  Cardiovascular: RRR without murmurs or extra sounds, radial pulses 2+ bilaterally  Abdomen: Soft, non-distended, non-tender, positive bowel sounds  Skin: Warm & dry, without lesions, erythema, rashes, or ecchymosis  Musculoskeletal: No joint/extremity redness, warmth, swelling, or tenderness with full ROM  Neurologic: A&Ox4, without focal deficit, cranial nerves II-XII grossly intact  Psychiatric: Alert & calm with appropriate affect, mood, insight, and thought processes      Signed Electronically by: Chuckie Cabrera DO    "

## 2024-04-30 PROBLEM — M51.369 BULGING OF LUMBAR INTERVERTEBRAL DISC: Status: ACTIVE | Noted: 2024-04-30

## 2024-04-30 PROBLEM — L50.1 CHRONIC IDIOPATHIC URTICARIA: Status: ACTIVE | Noted: 2024-04-30

## 2024-05-30 ENCOUNTER — THERAPY VISIT (OUTPATIENT)
Dept: PHYSICAL THERAPY | Facility: CLINIC | Age: 34
End: 2024-05-30
Attending: FAMILY MEDICINE
Payer: COMMERCIAL

## 2024-05-30 DIAGNOSIS — M51.369 BULGING OF LUMBAR INTERVERTEBRAL DISC: Primary | ICD-10-CM

## 2024-05-30 PROCEDURE — 97112 NEUROMUSCULAR REEDUCATION: CPT | Mod: GP

## 2024-05-30 PROCEDURE — 97110 THERAPEUTIC EXERCISES: CPT | Mod: GP

## 2024-05-30 PROCEDURE — 97161 PT EVAL LOW COMPLEX 20 MIN: CPT | Mod: GP

## 2024-05-30 NOTE — PROGRESS NOTES
PHYSICAL THERAPY EVALUATION  Type of Visit: Evaluation    See electronic medical record for Abuse and Falls Screening details.    Per MD note on 4/29, pt reports sharp pain at L5/S1 after a flight a few weeks ago. Pt previously experienced sciatic pain in addition to pain at L5/S1 due to a herniated disc at this location 10 months ago. Sciatic pain went away with injection.       Pt reports he initially injured his back 6 years ago. Injection managed pain initially very well but more recently he has continued to have flare ups. Pt has been going to the gym and isolating low back to keep it stable but is not doing compound exercises. Pt reports squats are bothering his back. Pt reports he has tight hamstrings as well. Pt reports if he overdoes it he feels bad for a week or two.     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 [employer]     Hobbies/Interests:   Activity level: active, 6 days a week at [employer], [employer]   JAY:       Patient goals for therapy:  complete compound full body exercises without pain    Pain assessment:  Location: L side of low back at L5/S1  Quality: sharp, also dull aching at all times   Worse with: flexion, deadlifts  Better with: lower back extension, hamstring stretching, moving more     Sleep Quality: good       Subjective       Presenting condition or subjective complaint:    Date of onset:      Relevant medical history:     Dates & types of surgery:      Prior diagnostic imaging/testing results:       Prior therapy history for the same diagnosis, illness or injury:        Living Environment  Social support:     Type of home:     Stairs to enter the home:         Ramp:     Stairs inside the home:         Help at home:    Equipment owned:       Employment:      Hobbies/Interests:      Patient goals for therapy:      Pain assessment: see above     Objective   PALPATION: Tender to palpation at L5/S1 on L side     POSTURE: resting anterior pelvic tilt      FUNCTIONAL MOBILITY/ GLUTE & CORE  STRENGTH:   Hamstring length assessment: L worse than R, both hips limited to less than 30 deg of flexion  Hip hinge: Good lumbo pelvic dissociation, poor hamstring flexibility with L worse than R. Compensation with R pelvic drop during hinge to compensate for flexibility    Assessment & Plan   CLINICAL IMPRESSIONS  Medical Diagnosis:      Treatment Diagnosis:     Impression/Assessment: Patient is a 34 year old male with complaints of L sided low back pain around L5-S1 that is worse with forward flexion of the hip.  The following significant findings have been identified: Pain, Decreased ROM/flexibility, and Decreased strength. These impairments interfere with their ability to perform recreational activities as compared to previous level of function.     Clinical Decision Making (Complexity):  Clinical Presentation: Stable/Uncomplicated  Clinical Presentation Rationale: based on medical and personal factors listed in PT evaluation  Clinical Decision Making (Complexity): Low complexity    PLAN OF CARE  Treatment Interventions:  Interventions: Gait Training, Manual Therapy, Neuromuscular Re-education, Therapeutic Activity, Therapeutic Exercise    Long Term Goals            Frequency of Treatment:    Duration of Treatment:      Education Assessment:        Risks and benefits of evaluation/treatment have been explained.   Patient/Family/caregiver agrees with Plan of Care.     Evaluation Time:             Signing Clinician: Maricarmen Serna, PT

## 2024-06-06 ENCOUNTER — THERAPY VISIT (OUTPATIENT)
Dept: PHYSICAL THERAPY | Facility: CLINIC | Age: 34
End: 2024-06-06
Attending: FAMILY MEDICINE
Payer: COMMERCIAL

## 2024-06-06 DIAGNOSIS — M51.369 BULGING OF LUMBAR INTERVERTEBRAL DISC: Primary | ICD-10-CM

## 2024-06-06 PROCEDURE — 97110 THERAPEUTIC EXERCISES: CPT | Mod: GP

## 2024-06-06 PROCEDURE — 97112 NEUROMUSCULAR REEDUCATION: CPT | Mod: GP

## 2024-06-26 ENCOUNTER — THERAPY VISIT (OUTPATIENT)
Dept: PHYSICAL THERAPY | Facility: CLINIC | Age: 34
End: 2024-06-26
Payer: COMMERCIAL

## 2024-06-26 DIAGNOSIS — M51.369 BULGING OF LUMBAR INTERVERTEBRAL DISC: Primary | ICD-10-CM

## 2024-06-26 PROCEDURE — 97112 NEUROMUSCULAR REEDUCATION: CPT | Mod: GP

## 2024-06-26 PROCEDURE — 97110 THERAPEUTIC EXERCISES: CPT | Mod: GP

## 2024-07-27 ENCOUNTER — ANCILLARY PROCEDURE (OUTPATIENT)
Dept: MRI IMAGING | Facility: CLINIC | Age: 34
End: 2024-07-27
Attending: PHYSICIAN ASSISTANT
Payer: COMMERCIAL

## 2024-07-27 DIAGNOSIS — M54.50 LOW BACK PAIN: ICD-10-CM

## 2024-07-27 PROCEDURE — 72148 MRI LUMBAR SPINE W/O DYE: CPT | Performed by: STUDENT IN AN ORGANIZED HEALTH CARE EDUCATION/TRAINING PROGRAM

## 2024-10-08 NOTE — PATIENT INSTRUCTIONS
I have ordered an injection for your low back.  Once we have a prior authorization from your insurance company you will get a phone call to schedule this.  We do have some availability this week on Friday.  You will need a  for this injection.    Recommend that you continue doing your physical therapy and continue gabapentin.    We discussed a possible neurosurgery referral in the future should this not improve in the next 6 months.    The clinic phone number for my clinic is [phone].  Please call with any questions.  If you do not receive a phone call in the next 2 days to schedule the injection please call this number and let them know.    Zahida Ramey MD   
Detail Level: Detailed

## 2024-12-03 ENCOUNTER — DOCUMENTATION ONLY (OUTPATIENT)
Dept: PHYSICAL THERAPY | Facility: CLINIC | Age: 34
End: 2024-12-03
Payer: COMMERCIAL

## 2024-12-03 DIAGNOSIS — M51.369 BULGING OF LUMBAR INTERVERTEBRAL DISC: Primary | ICD-10-CM

## 2024-12-03 NOTE — PROGRESS NOTES
DISCHARGE  Reason for Discharge: Patient has failed to schedule further appointments.    Equipment Issued: n/a    Discharge Plan: discharge due to lack of follow up.     Referring Provider:  No ref. provider found

## 2025-01-24 NOTE — TELEPHONE ENCOUNTER
Problem: Adult Inpatient Plan of Care  Goal: Plan of Care Review  Outcome: Progressing  Flowsheets (Taken 1/24/2025 0446)  Progress: improving  Outcome Evaluation: VSS. 4LNC. A&Ox4. No complaints of pain or nausea. Wound on LLE redressed. Chest tube in place to low wall suction. Fall and skin precautions in place. Will continue plan of care.  Plan of Care Reviewed With: patient  Goal: Patient-Specific Goal (Individualized)  Outcome: Progressing  Goal: Absence of Hospital-Acquired Illness or Injury  Outcome: Progressing  Intervention: Identify and Manage Fall Risk  Recent Flowsheet Documentation  Taken 1/24/2025 0400 by Kahlil Bowman, LEANNA  Safety Promotion/Fall Prevention:   activity supervised   assistive device/personal items within reach   clutter free environment maintained   nonskid shoes/slippers when out of bed   room organization consistent   safety round/check completed  Taken 1/24/2025 0200 by Kahlil Bowman, LEANNA  Safety Promotion/Fall Prevention:   activity supervised   assistive device/personal items within reach   clutter free environment maintained   nonskid shoes/slippers when out of bed   room organization consistent   safety round/check completed  Taken 1/24/2025 0000 by Kahlil Bowman, LEANNA  Safety Promotion/Fall Prevention:   activity supervised   assistive device/personal items within reach   clutter free environment maintained   nonskid shoes/slippers when out of bed   room organization consistent   safety round/check completed  Taken 1/23/2025 2200 by Kahlil Bowman, LEANNA  Safety Promotion/Fall Prevention:   activity supervised   assistive device/personal items within reach   clutter free environment maintained   nonskid shoes/slippers when out of bed   room organization consistent   safety round/check completed  Taken 1/23/2025 2000 by Kahlil Bowman, LEANNA  Safety Promotion/Fall Prevention:   activity supervised   assistive device/personal items within reach   clutter free environment maintained    Placed outreach call to patient. Did not reach message. Left voice message explaining that in light of COVID-19 outbreak and as directed by clinical supervisor and clinic leadership, our next appointment  will be conducted by telephone. Reminded him of appointment tomorrow 04/01/2020 at 8:20 am, informing him that I would call him then.      Stephie Ayala, PhD.  Behavioral Health Fellow     nonskid shoes/slippers when out of bed   room organization consistent   safety round/check completed  Intervention: Prevent Skin Injury  Recent Flowsheet Documentation  Taken 1/24/2025 0400 by Kahlil Bowman RN  Body Position:   turned   right  Skin Protection:   incontinence pads utilized   silicone foam dressing in place   transparent dressing maintained  Taken 1/24/2025 0200 by Kahlil Bowman RN  Body Position: supine  Skin Protection:   incontinence pads utilized   silicone foam dressing in place   transparent dressing maintained  Taken 1/24/2025 0000 by Kahlil Bowman RN  Body Position:   turned   left  Skin Protection:   incontinence pads utilized   silicone foam dressing in place   transparent dressing maintained  Taken 1/23/2025 2200 by Kahlil Bowman RN  Body Position:   supine   legs elevated  Skin Protection:   incontinence pads utilized   silicone foam dressing in place   transparent dressing maintained  Taken 1/23/2025 2000 by Kahlil Bowman RN  Body Position:   turned   right  Skin Protection: (silicone dressings peeled back and skin assessed)   incontinence pads utilized   silicone foam dressing in place   transparent dressing maintained  Intervention: Prevent and Manage VTE (Venous Thromboembolism) Risk  Recent Flowsheet Documentation  Taken 1/23/2025 2000 by Kahlil Bowman RN  VTE Prevention/Management:   bilateral   SCDs (sequential compression devices) on   foot pump device on  Intervention: Prevent Infection  Recent Flowsheet Documentation  Taken 1/24/2025 0400 by Kahlil Bowman RN  Infection Prevention:   cohorting utilized   environmental surveillance performed   rest/sleep promoted   single patient room provided  Taken 1/24/2025 0200 by Kahlil Bowman RN  Infection Prevention:   cohorting utilized   environmental surveillance performed   single patient room provided   rest/sleep promoted  Taken 1/24/2025 0000 by Kahlil Bowman RN  Infection Prevention:   cohorting utilized   environmental  surveillance performed   single patient room provided   rest/sleep promoted  Taken 1/23/2025 2200 by Kahlil Bowman RN  Infection Prevention:   cohorting utilized   environmental surveillance performed   rest/sleep promoted   single patient room provided  Taken 1/23/2025 2000 by Kahlil Bowman RN  Infection Prevention:   environmental surveillance performed   cohorting utilized   single patient room provided   rest/sleep promoted  Goal: Optimal Comfort and Wellbeing  Outcome: Progressing  Intervention: Provide Person-Centered Care  Recent Flowsheet Documentation  Taken 1/23/2025 2000 by Kahlil Bowman RN  Trust Relationship/Rapport:   choices provided   care explained  Goal: Readiness for Transition of Care  Outcome: Progressing     Problem: Skin Injury Risk Increased  Goal: Skin Health and Integrity  Outcome: Progressing  Intervention: Optimize Skin Protection  Recent Flowsheet Documentation  Taken 1/24/2025 0400 by Kahlil Bowman RN  Activity Management: activity encouraged  Pressure Reduction Techniques:   positioned off wounds   pressure points protected  Head of Bed (HOB) Positioning: HOB elevated  Pressure Reduction Devices:   foam padding utilized   positioning supports utilized   pressure-redistributing mattress utilized  Skin Protection:   incontinence pads utilized   silicone foam dressing in place   transparent dressing maintained  Taken 1/24/2025 0200 by Kahlil Bowman RN  Activity Management: activity encouraged  Pressure Reduction Techniques:   pressure points protected   positioned off wounds  Head of Bed (HOB) Positioning: HOB elevated  Pressure Reduction Devices:   foam padding utilized   positioning supports utilized   pressure-redistributing mattress utilized  Skin Protection:   incontinence pads utilized   silicone foam dressing in place   transparent dressing maintained  Taken 1/24/2025 0000 by Kahlil Bowman RN  Activity Management: activity encouraged  Pressure Reduction Techniques:    positioned off wounds   pressure points protected  Head of Bed (HOB) Positioning: \[facility] [place] [facility]\"" elevated  Pressure Reduction Devices:   foam padding utilized   positioning supports utilized   pressure-redistributing mattress utilized  Skin Protection:   incontinence pads utilized   silicone foam dressing in place   transparent dressing maintained  Taken 1/23/2025 2200 by Kahlil Bowman RN  Activity Management: activity encouraged  Pressure Reduction Techniques:   positioned off wounds   pressure points protected  Head of Bed (\[facility]\"") Positioning: \[facility]\"" elevated  Pressure Reduction Devices:   foam padding utilized   positioning supports utilized   pressure-redistributing mattress utilized  Skin Protection:   incontinence pads utilized   silicone foam dressing in place   transparent dressing maintained  Taken 1/23/2025 2000 by Kahlil Bowman RN  Activity Management: activity encouraged  Pressure Reduction Techniques:   positioned off wounds   pressure points protected  Head of Bed (\[facility]\"") Positioning: \[facility]\"" elevated  Pressure Reduction Devices:   foam padding utilized   positioning supports utilized   pressure-redistributing mattress utilized  Skin Protection: (silicone dressings peeled back and skin assessed)   incontinence pads utilized   silicone foam dressing in place   transparent dressing maintained     Problem: Comorbidity Management  Goal: Maintenance of Heart Failure Symptom Control  Outcome: Progressing  Goal: Blood Pressure in Desired Range  Outcome: Progressing     Problem: Fall Injury Risk  Goal: Absence of Fall and Fall-Related Injury  Outcome: Progressing  Intervention: Promote Injury-Free Environment  Recent Flowsheet Documentation  Taken 1/24/2025 0400 by Kahlil Bowman, RN  Safety Promotion/Fall Prevention:   activity supervised   assistive device/personal items within reach   clutter free environment maintained   nonskid shoes/slippers when out of bed   room organization consistent   safety round/check completed  Taken  1/24/2025 0200 by Kahlil Bowman, LEANNA  Safety Promotion/Fall Prevention:   activity supervised   assistive device/personal items within reach   clutter free environment maintained   nonskid shoes/slippers when out of bed   room organization consistent   safety round/check completed  Taken 1/24/2025 0000 by Kahlil Bowman, LEANNA  Safety Promotion/Fall Prevention:   activity supervised   assistive device/personal items within reach   clutter free environment maintained   nonskid shoes/slippers when out of bed   room organization consistent   safety round/check completed  Taken 1/23/2025 2200 by Kahlil Bowman, LEANNA  Safety Promotion/Fall Prevention:   activity supervised   assistive device/personal items within reach   clutter free environment maintained   nonskid shoes/slippers when out of bed   room organization consistent   safety round/check completed  Taken 1/23/2025 2000 by Kahlil Bowman, LEANNA  Safety Promotion/Fall Prevention:   activity supervised   assistive device/personal items within reach   clutter free environment maintained   nonskid shoes/slippers when out of bed   room organization consistent   safety round/check completed   Goal Outcome Evaluation:  Plan of Care Reviewed With: patient        Progress: improving  Outcome Evaluation: VSS. 4LNC. A&Ox4. No complaints of pain or nausea. Wound on LLE redressed. Chest tube in place to low wall suction. Fall and skin precautions in place. Will continue plan of care.

## 2025-03-31 ENCOUNTER — PATIENT OUTREACH (OUTPATIENT)
Dept: CARE COORDINATION | Facility: CLINIC | Age: 35
End: 2025-03-31
Payer: COMMERCIAL

## 2025-05-14 SDOH — HEALTH STABILITY: PHYSICAL HEALTH: ON AVERAGE, HOW MANY MINUTES DO YOU ENGAGE IN EXERCISE AT THIS LEVEL?: 90 MIN

## 2025-05-14 SDOH — HEALTH STABILITY: PHYSICAL HEALTH: ON AVERAGE, HOW MANY DAYS PER WEEK DO YOU ENGAGE IN MODERATE TO STRENUOUS EXERCISE (LIKE A BRISK WALK)?: 7 DAYS

## 2025-05-14 ASSESSMENT — SOCIAL DETERMINANTS OF HEALTH (SDOH): HOW OFTEN DO YOU GET TOGETHER WITH FRIENDS OR RELATIVES?: THREE TIMES A WEEK

## 2025-05-15 ENCOUNTER — OFFICE VISIT (OUTPATIENT)
Dept: FAMILY MEDICINE | Facility: CLINIC | Age: 35
End: 2025-05-15
Payer: COMMERCIAL

## 2025-05-15 VITALS
OXYGEN SATURATION: 97 % | BODY MASS INDEX: 30.11 KG/M2 | SYSTOLIC BLOOD PRESSURE: 133 MMHG | TEMPERATURE: 98.1 F | HEART RATE: 64 BPM | RESPIRATION RATE: 16 BRPM | HEIGHT: 71 IN | WEIGHT: 215.1 LBS | DIASTOLIC BLOOD PRESSURE: 77 MMHG

## 2025-05-15 DIAGNOSIS — Z31.69 PRE-CONCEPTION COUNSELING: ICD-10-CM

## 2025-05-15 DIAGNOSIS — Z00.00 ROUTINE GENERAL MEDICAL EXAMINATION AT A HEALTH CARE FACILITY: Primary | ICD-10-CM

## 2025-05-15 DIAGNOSIS — R63.4 LOSS OF WEIGHT: ICD-10-CM

## 2025-05-15 LAB
ALBUMIN SERPL BCG-MCNC: 4.5 G/DL (ref 3.5–5.2)
ALP SERPL-CCNC: 69 U/L (ref 40–150)
ALT SERPL W P-5'-P-CCNC: 28 U/L (ref 0–70)
ANION GAP SERPL CALCULATED.3IONS-SCNC: 9 MMOL/L (ref 7–15)
AST SERPL W P-5'-P-CCNC: 35 U/L (ref 0–45)
BILIRUB SERPL-MCNC: 0.5 MG/DL
BUN SERPL-MCNC: 20.1 MG/DL (ref 6–20)
CALCIUM SERPL-MCNC: 9.2 MG/DL (ref 8.8–10.4)
CHLORIDE SERPL-SCNC: 103 MMOL/L (ref 98–107)
CREAT SERPL-MCNC: 1.11 MG/DL (ref 0.67–1.17)
EGFRCR SERPLBLD CKD-EPI 2021: 90 ML/MIN/1.73M2
GLUCOSE SERPL-MCNC: 95 MG/DL (ref 70–99)
HCO3 SERPL-SCNC: 27 MMOL/L (ref 22–29)
POTASSIUM SERPL-SCNC: 4.4 MMOL/L (ref 3.4–5.3)
PROT SERPL-MCNC: 6.7 G/DL (ref 6.4–8.3)
SODIUM SERPL-SCNC: 139 MMOL/L (ref 135–145)

## 2025-05-15 RX ORDER — MULTIVITAMIN
1 TABLET ORAL DAILY
COMMUNITY

## 2025-05-15 ASSESSMENT — PAIN SCALES - GENERAL: PAINLEVEL_OUTOF10: NO PAIN (0)

## 2025-05-15 NOTE — PATIENT INSTRUCTIONS
Patient Education   Preventive Care Advice   This is general advice given by our system to help you stay healthy. However, your care team may have specific advice just for you. Please talk to your care team about your preventive care needs.  Nutrition  Eat 5 or more servings of fruits and vegetables each day.  Try wheat bread, brown rice and whole grain pasta (instead of white bread, rice, and pasta).  Get enough calcium and vitamin D. Check the label on foods and aim for 100% of the RDA (recommended daily allowance).  Lifestyle  Exercise at least 150 minutes each week  (30 minutes a day, 5 days a week).  Do muscle strengthening activities 2 days a week. These help control your weight and prevent disease.  No smoking.  Wear sunscreen to prevent skin cancer.  Have a dental exam and cleaning every 6 months.  Yearly exams  See your health care team every year to talk about:  Any changes in your health.  Any medicines your care team has prescribed.  Preventive care, family planning, and ways to prevent chronic diseases.  Shots (vaccines)   HPV shots (up to age 26), if you've never had them before.  Hepatitis B shots (up to age 59), if you've never had them before.  COVID-19 shot: Get this shot when it's due.  Flu shot: Get a flu shot every year.  Tetanus shot: Get a tetanus shot every 10 years.  Pneumococcal, hepatitis A, and RSV shots: Ask your care team if you need these based on your risk.  Shingles shot (for age 50 and up)  General health tests  Diabetes screening:  Starting at age 35, Get screened for diabetes at least every 3 years.  If you are younger than age 35, ask your care team if you should be screened for diabetes.  Cholesterol test: At age 39, start having a cholesterol test every 5 years, or more often if advised.  Bone density scan (DEXA): At age 50, ask your care team if you should have this scan for osteoporosis (brittle bones).  Hepatitis C: Get tested at least once in your life.  STIs (sexually  transmitted infections)  Before age 24: Ask your care team if you should be screened for STIs.  After age 24: Get screened for STIs if you're at risk. You are at risk for STIs (including HIV) if:  You are sexually active with more than one person.  You don't use condoms every time.  You or a partner was diagnosed with a sexually transmitted infection.  If you are at risk for HIV, ask about PrEP medicine to prevent HIV.  Get tested for HIV at least once in your life, whether you are at risk for HIV or not.  Cancer screening tests  Cervical cancer screening: If you have a cervix, begin getting regular cervical cancer screening tests starting at age 21.  Breast cancer scan (mammogram): If you've ever had breasts, begin having regular mammograms starting at age 40. This is a scan to check for breast cancer.  Colon cancer screening: It is important to start screening for colon cancer at age 45.  Have a colonoscopy test every 10 years (or more often if you're at risk) Or, ask your provider about stool tests like a FIT test every year or Cologuard test every 3 years.  To learn more about your testing options, visit:   .  For help making a decision, visit:   https://bit.ly/fh32216.  Prostate cancer screening test: If you have a prostate, ask your care team if a prostate cancer screening test (PSA) at age 55 is right for you.  Lung cancer screening: If you are a current or former smoker ages 50 to 80, ask your care team if ongoing lung cancer screenings are right for you.  For informational purposes only. Not to replace the advice of your health care provider. Copyright   2023 [employer]. All rights reserved. Clinically reviewed by the [facility] Transitions Program. [employer] 465895 - REV 01/24.

## 2025-05-15 NOTE — PROGRESS NOTES
"Preventive Care Visit  [facility] JOSEPH Cabrera DO, Family Medicine  May 15, 2025      Assessment & Plan     Routine general medical examination at a health care facility    Loss of weight  - Comprehensive Metabolic Panel; Future    Pre-conception counseling  - Adult Genetics & Metabolism  Referral; Future      BMI  Estimated body mass index is 30 kg/m  as calculated from the following:    Height as of this encounter: 1.803 m (5' 11\").    Weight as of this encounter: 97.6 kg (215 lb 1.6 oz).     Counseling  Appropriate preventive services were addressed with this patient via screening, questionnaire, or discussion as appropriate for fall prevention, nutrition, physical activity, Tobacco-use cessation, social engagement, weight loss and cognition.  Checklist reviewing preventive services available has been given to the patient.  Reviewed patient's diet, addressing concerns and/or questions.       Follow-up  Return in about 53 weeks (around 5/21/2026) for Annual Wellness Visit.    Bladimir Anderson is a 35 year old, presenting for the following:  Physical (No real concerns)        5/15/2025     8:01 AM   Additional Questions   Roomed by Eulalia   Accompanied by self         5/15/2025    Information    services provided? No          HPI  Discussed hx of weight loss & health journey. Has done extensive exercise, dietary, caloric monitoring, etc over the last few months. Was doing injectable semaglutide compounded for about 9 months. Stopped about a month ago. On the medication, dropped about 25lbs. 231.9 was highest he was, down to 206.7; today 215.        Advance Care Planning    Patient states has Health Care Directive and will send to [facility].        5/14/2025   General Health   How would you rate your overall physical health? Good   Feel stress (tense, anxious, or unable to sleep) No; less stress than ever         5/14/2025   Nutrition "   Three or more servings of calcium each day? Yes   Diet: Vegetarian/vegan   How many servings of fruit and vegetables per day? 4 or more   How many sweetened beverages each day? 0-1         5/14/2025   Exercise   Days per week of moderate/strenous exercise 7 days   Average minutes spent exercising at this level 90 min daily         5/14/2025   Social Factors   Frequency of gathering with friends or relatives Three times a week   Worry food won't last until get money to buy more No   Food not last or not have enough money for food? No   Do you have housing? (Housing is defined as stable permanent housing and does not include staying outside in a car, in a tent, in an abandoned building, in an overnight shelter, or couch-surfing.) Yes   Are you worried about losing your housing? No   Lack of transportation? No   Unable to get utilities (heat,electricity)? No         5/14/2025   Dental   Dentist two times every year? Yes         Today's PHQ-2 Score:       5/14/2025    10:02 AM   PHQ-2 ( 1999 [employer])   Q1: Little interest or pleasure in doing things 0   Q2: Feeling down, depressed or hopeless 0   PHQ-2 Score 0    Q1: Little interest or pleasure in doing things Not at all   Q2: Feeling down, depressed or hopeless Not at all   PHQ-2 Score 0       Patient-reported           5/14/2025   Substance Use   Alcohol more than 3/day or more than 7/wk No   Do you use any other substances recreationally? No     Social History     Tobacco Use    Smoking status: Never     Passive exposure: Current    Smokeless tobacco: Never   Substance Use Topics    Alcohol use: Yes     Comment: socially     Drug use: Never           5/14/2025   STI Screening   New sexual partner(s) since last STI/HIV test? No         5/14/2025   Contraception/Family Planning   Questions about contraception or family planning (!) YES - Discussing conception with Wife; had a friend who recently had a child born deaf. With Justin's history of exposure to potential  "teratogens with his work.        Reviewed and updated as needed this visit by Provider                    Past Medical History:   Diagnosis Date    ADHD     Anxiety      No past surgical history on file.      Review of Systems  Constitutional, HEENT, cardiovascular, pulmonary, gi and gu systems are negative, except as otherwise noted.     Objective    Exam  /77   Pulse 64   Temp 98.1  F (36.7  C) (Oral)   Resp 16   Ht 1.803 m (5' 11\")   Wt 97.6 kg (215 lb 1.6 oz)   SpO2 97%   BMI 30.00 kg/m     Estimated body mass index is 30 kg/m  as calculated from the following:    Height as of this encounter: 1.803 m (5' 11\").    Weight as of this encounter: 97.6 kg (215 lb 1.6 oz).    Physical Exam  GENERAL: alert and no distress  EYES: Eyes grossly normal to inspection, PERRL and conjunctivae and sclerae normal  HENT: ear canals and TM's normal, nose and mouth without ulcers or lesions  NECK: no adenopathy, no asymmetry, masses, or scars  RESP: lungs clear to auscultation - no rales, rhonchi or wheezes  CV: regular rate and rhythm, normal S1 S2, no S3 or S4, no murmur, click or rub, no peripheral edema  ABDOMEN: soft, nontender, no hepatosplenomegaly, no masses and bowel sounds normal  MS: no gross musculoskeletal defects noted, no edema  SKIN: no suspicious lesions or rashes  NEURO: Normal strength and tone, mentation intact and speech normal  PSYCH: mentation appears normal, affect normal/bright        Signed Electronically by: Chuckie Cabrera,     "

## 2025-05-19 ENCOUNTER — TRANSCRIBE ORDERS (OUTPATIENT)
Dept: MATERNAL FETAL MEDICINE | Facility: CLINIC | Age: 35
End: 2025-05-19
Payer: COMMERCIAL

## 2025-05-19 ENCOUNTER — RESULTS FOLLOW-UP (OUTPATIENT)
Dept: FAMILY MEDICINE | Facility: CLINIC | Age: 35
End: 2025-05-19

## 2025-05-19 DIAGNOSIS — Z31.69 ENCOUNTER FOR PRECONCEPTION CONSULTATION: Primary | ICD-10-CM

## 2025-05-19 DIAGNOSIS — Z31.69 PRE-CONCEPTION COUNSELING: Primary | ICD-10-CM

## 2025-05-22 ENCOUNTER — OFFICE VISIT (OUTPATIENT)
Dept: MATERNAL FETAL MEDICINE | Facility: CLINIC | Age: 35
End: 2025-05-22
Attending: OBSTETRICS & GYNECOLOGY
Payer: COMMERCIAL

## 2025-05-22 ENCOUNTER — LAB (OUTPATIENT)
Dept: LAB | Facility: CLINIC | Age: 35
End: 2025-05-22
Attending: OBSTETRICS & GYNECOLOGY
Payer: COMMERCIAL

## 2025-05-22 DIAGNOSIS — Z31.440 ENCOUNTER OF MALE FOR TESTING FOR GENETIC DISEASE CARRIER STATUS FOR PROCREATIVE MANAGEMENT: ICD-10-CM

## 2025-05-22 DIAGNOSIS — Z31.440 ENCOUNTER OF MALE FOR TESTING FOR GENETIC DISEASE CARRIER STATUS FOR PROCREATIVE MANAGEMENT: Primary | ICD-10-CM

## 2025-05-22 DIAGNOSIS — Z31.69 ENCOUNTER FOR PRECONCEPTION CONSULTATION: ICD-10-CM

## 2025-05-22 PROCEDURE — 96041 GENETIC COUNSELING SVC EA 30: CPT

## 2025-05-22 PROCEDURE — 36415 COLL VENOUS BLD VENIPUNCTURE: CPT

## 2025-05-22 NOTE — PROGRESS NOTES
[facility] [place]  Genetic Counseling Consult    Patient:  Justin Morales YOB: 1990   Date of Service:  05/22/25      Justin accompanied his partner, Anum, to his appointment at the [facility] [place] for genetic consultation. The option to pursue carrier screening was discussed today.        Impression/Plan:   1. Justin and his partner, Anum, elected to pursue expanded carrier screening. Justin Morales will have screening for Horizon 574 carrier screening panel (panel code: [id number], which automatically excludes all X-linked conditions for male patients). Anum will have screening for 574 conditions (including all X-linked conditions) through [facility]. Results are expected within 2-3 weeks, and will be available in [employer].  We will contact the couple to discuss the results. The couple requested that we contact Justin with results once both are available. He provided verbal permission for results to be left in her voicemail. Authorization to share protected health information was signed today's visit.      2. Justin also explained that he is a  and has been exposed to various chemical substances throughout his carrier. He reports that he had significant ethidium bromide exposure approximately 10 years ago. We discussed that while there is not currently genetic testing validated to tests for a risk of birth defects or congenital anomalies associated with these chemical exposures, risk is likely minimal given the time since exposure. We also reviewed that since Justin's partner, Anum, will likely be over the age of 35 at the time of delivery, she will likely be offered a level II comprehensive ultrasound to better screening for birth defects once the couple is pregnant.     Carrier Screening:   Expanded carrier screening for mutations in a large panel of genes associated with autosomal recessive conditions  including cystic fibrosis, spinal muscular atrophy, and others, is now available.    Carrier screening is an option and a personal decision to pursue. If an individual or couple is interested or wishes to pursue carrier screening the following is discussed:  For most genetic conditions, carriers are healthy individuals. For autosomal recessive conditions (ex cystic fibrosis, sickle cell anemia, etc), individuals have two copies of each gene. Carrier individuals have a mutation in one copy. For X-linked conditions, females have two copies of each gene and are considered carriers if one copy has amutation. Males only have one copy of an X-linked gene, therefore, if that one copy has a mutation they are affected by the condition, rather than only being a carrier    For select conditions, carriers can have symptoms, however, the chance is variable. Examples of these conditions include:  Female premutation carriers of fragile X syndrome can have symptoms in adulthood including premature ovarian failure and ataxia. If a female passes the mutation to a son they are at a high risk for fragile X syndrome, which is themost common genetic cause for autism spectrum disorder. Since Fragile X syndrome is carried on the X chromosome, the risk for an unaffected male to pass down an X chromosome which would cause Fragile X syndrome is very low. Males who are premutation carriers for Fragile X syndrome do have a risk of developing adult onset ataxia.  Carrier screening for X-linked conditions is not available for males through [employer]  Female or male carriers of Gaucher disease can have an increased chance for developing Parkinson's disease. Gaucher disease is a severe metabolic disorder.     We reviewed that there is a law in place, the Genetic Information Nondiscrimination Act (NAVI), that protects patients from discrimination by health insurance companies and employers based on their genetic information. NAVI does not  protect against discrimination by life insurance companies or disability insurance.    If both parents are carriers of an autosomal recessive condition, there are three possible outcomes for each pregnancy/child: 25% unaffected, 50% carrier, and 25% affected. The only method to determine the outcome or diagnosis the condition in a pregnancy is an invasive testing option such as an amniocentesis. Some genetic conditions will have ultrasound findings during the pregnancybut many do not. Some couples will choose the diagnostic testing to plan for delivery or choose termination of an affected pregnancy. Other couples will choose to test for the condition after delivery. While these conditionscannot be cured or treated during the pregnancy it may guide management recommendations (ultrasounds) for pregnancy as well as delivery and infant care.     We discussed that expanded carrier screening is designed to identify carrier status for conditions that are primarily childhood or adolescent onset. Expanded carrier screening does not evaluate for adult-onset conditions such as hereditary cancer syndromes, dementia/ Alzheimer's disease, or cardiovascular disease risk factors. Additionally, expanded carrier screening is not comprehensive for all known genetic diseases or inherited conditions. This is a screening test, and residual carrier status risk figures will be provided to the patient after results become available. Carrier screening is not meant to diagnose the patient with a condition, and generally carriers are asymptomatic. However, certain genes may confer increased risks for various health concerns in carriers (DMD, FMR1, etc).  Patients are encouraged to share results with their primary care providers to ensure appropriate screening. If we are notified by the performing laboratory of a variant reclassification, the patient will be contacted.     We reviewed availability of expanded carrier screening through both  [facility] and [facility] and different panel sizes. [facility] laboratory will report on pseudodeficiency alleles, which are benign variants that are not known to be associated with disease and are not thought to impact the individuals risk to be a carrier for these conditions. However, the presence of pseudodeficiency alleles can exhibit false positive results on biochemical tests such as  screen.     If an individual is a carrier, family members could be as well. The patient is encouraged to share this information with relatives.    The lab will communicate the out-of-pocket cost with the patient. The default is billing through insurance, and the option to switch to the $249 self-pay option must be designated before insurance is billed. The option of self pay was discussed and declined for Justin, however, Anum opted into [facility] self-pay pricing.    Justin and his partner, Anmu, elected to pursue expanded carrier screening. Justin Morales will have screening for Horizon 574 carrier screening panel (panel code: [id number], which automatically excludes all X-linked conditions for male patients). Anum will have screening for 574 conditions (including all X-linked conditions) through [facility]. Results are expected within 2-3 weeks, and will be available in [employer].  We will contact the couple to discuss the results. The couple requested that we contact Justin with results once both are available. He provided verbal permission for results to be left in her voicemail. Authorization to share protected health information was signed today's visit.      We reviewed the benefits and limitations of this testing.  Screening tests provide a risk assessment specific to the pregnancy for certain fetal chromosome abnormalities, but cannot definitively diagnose or exclude a fetal chromosome abnormality.  Follow-up genetic counseling and consideration of diagnostic testing is recommended with any abnormal  "screening result.     Diagnostic tests carry inherent risks- including risk of miscarriage- that require careful consideration.  These tests can detect fetal chromosome abnormalities with greater than 99% certainty.  Results can be compromised by maternal cell contamination or mosaicism, and are limited by the resolution of cytogenetic G-banding technology.  There is no screening nor diagnostic test that can detect all forms of birth defects or mental disability.    FAMILY HISTORY   A three-generation pedigree was obtained today and is scanned under the \"Media\" tab in Epic. The family history was reported by Anum and their partner.    The following significant findings were reported today:   Justin's partner, Anum, is currently 34 years old and healthy.     Justin reports that his brother has a Chiari II malformation. It is uncertain whether it is congenital or an acquired malformation. Chiari malformations can be a congenital or an acquired condition. They involve herniation of of the cerebellar tissues into the foramen magnum, a hole at the bottom of the skull. Most frequently, they appear isolated and sporadically in individuals. They have also been reported as one feature in a larger spectrum of clinical features in various genetic syndromes. While primarily sporadic, isolated/non-syndromic Chiari malformations have been seen to cluster in families. Results from twin studies and familial studies suggest an underlying genetic etiology for some cases of Chiari malformation, but the specific genetic factors in question are currently unknown. We reviewed in detail the multifactorial inheritance associated with isolated Chiari malformations. While some family studies suggest an autosomal dominant inheritance pattern, others suggest autosomal recessive patterns as well.     Justin reports that his sister has a personal history of pancreatitis as well as several other unexplained GI anomalies/concerns. He " reports that he believes there was extensive testing done, but a diagnosis or case of the medical concerns was never able to be identified. If more information regarding this history is learned, Justin is  encouraged to reach out so a more accurate risk assessment could be made.    Justin reports that his maternal grandmother had a stillborn child, likely around 20w gestation. He recalls that be believes it was possibly due to a genetic disorder but was uncertain what it was. Family history such as stillbirths, infant deaths, or pregnancy complications can be difficult to assess if this occurred many decades ago since details are typically scarce. Therefore, it is challenging to assess if this family history modifies risks to the current pregnancy. This is often unlikely, especially if the history is several generations away from the current pregnancy. If more information is collected regarding this family history it should be revisited.     Anum reports that her 27 year old brother was diagnosed with ITP (immune thrombocytopenic purpura) in his teenage years and outgrew the disorder about 10 years later. She reports that he has not symptoms of the condition now. This condition, and other autoimmune disorders, are multifactorial in nature, resulting from both genetic and non-genetic factors. Once an autoimmune disease is present in a family, other relatives may be at increased risk to develop the same disease or a different autoimmune disease.  Presymptomatic and prenatal testing are not available for autoimmune disorders.    Otherwise, the reported family history is unremarkable for multiple miscarriages, stillbirths, birth defects, intellectual disabilities, known genetic conditions, and consanguinity.    GENETIC TESTING OPTIONS   Genetic testing during a pregnancy includes screening and diagnostic procedures.      Screening tests are non-invasive which means no risk to the pregnancy and includes ultrasounds  and blood work. The benefits and limitations of screening were reviewed. Screening tests provide a risk assessment (chance) specific to the pregnancy for certain fetal chromosome abnormalities but cannot definitively diagnose or exclude a fetal chromosome abnormality. Follow-up genetic counseling and consideration of diagnostic testing is recommended with any abnormal screening result. Diagnostic testing during a pregnancy is more certain and can test for more conditions. However, the tests do have a risk of miscarriage that requires careful consideration. These tests can detect fetal chromosome abnormalities with greater than 99% certainty. Results can be compromised by maternal cell contamination or mosaicism and are limited by the resolution of current genetic testing technology.     There is no screening or diagnostic test that detects all forms of birth defects or intellectual disability.     We discussed the following screening options:     Non-invasive prenatal testing (NIPT)  Also called cell-free DNA screening because it detects chromosomes from the placenta in the pregnant person's blood  Can be done any time after 10 weeks gestation  Standard recommendation for NIPT screens for trisomy 21, trisomy 18, trisomy 13, with the option of adding sex chromosome aneuploidies, without or without predicted sex  Cannot screen for open neural tube defects, maternal serum AFP after 15 weeks is recommended  New NIPT options include screening for other trisomies, microdeletion syndromes, and in some cases fetal blood antigens. Guidelines do not recommend these conditions are included in standard screening. These options have limitations and should be discussed with a genetic counselor.   However, current (2023) ACMG guidelines do recommend that screening for one microdeletion syndrome, called 22q11.2 deletion syndrome be offered to all pregnant patients. 22q11.2 deletion syndrome has an estimated prevalence of 1 in 990  to 1 in 2148 (0.05-0.1%). Risk is not thought to increase with maternal age. Clinical features are variable but include congenital heart defects, cleft palate, developmental delays, immune system deficiencies, and hearing loss. Approximately 90% of cases are de cynthia (a sporadic new change in a pregnancy). Cell-free DNA screening for 22q11.2 deletion syndrome is available with the inclusion of other microdeletion syndromes. There is less data about the performance of cell-free DNA screening for more rare microdeletions and the chance for false positives or negative may be increased.  We discussed the limitations of cell-free DNA screening in detecting microdeletions and the possibility of false positives and false negatives. Microdeletion screening was not discussed.    Carrier screening  Risk assessment for certain autosomal recessive and x-linked conditions. These conditions are generally infantile- or childhood-onset conditions.   Can be done any time during the pregnancy or prior to pregnancy.  Can screen for over many different genetic conditions, panel size ranging by lab.  Is not intended to diagnosis a condition in the carrier parent.    Even with negative results, a residual risk for screened conditions remains.      We discussed the following ultrasound options:    Comprehensive level II ultrasound (Fetal Anatomy Ultrasound)  Ultrasound done between 18-20 weeks gestation  Screens for major birth defects and markers for aneuploidy (like trisomy 21 and trisomy 18)  Includes looking at the fetus/baby's growth, heart, organs (stomach, kidneys), placenta, and amniotic fluid    We discussed the following diagnostic options:     Chorionic villus sampling (CVS)  Invasive diagnostic procedure done between 10w0d and 13w6d  The procedure collects a small sample from the placenta for the purpose of chromosomal testing and/or other genetic testing  Diagnostic result; more than 99% sensitivity for fetal chromosome  abnormalities  Cannot screen for open neural tube defects, maternal serum AFP after 15 weeks is recommended    Amniocentesis  Invasive diagnostic procedure done after 15 weeks gestation  The procedure collects a small sample of amniotic fluid for the purpose of chromosomal testing and/or other genetic testing  Diagnostic result; more than 99% sensitivity for fetal chromosome abnormalities  Testing for AFP in the amniotic fluid can test for open neural tube defects    It was a pleasure to be involved with Justin s care. I spent 60 minutes on the date of the encounter doing chart review, obtaining history, test coordination, documentation, and further activities as noted above.    Elina Henderson MS, [facility]  Certified and Licensed Genetic Counselor  [facility]  Maternal Fetal Medicine  Office: [phone]  [facility]: [phone]   Fax: [phone]  [facility]

## 2025-05-23 LAB
LAB ORDER RESULT STATUS: NORMAL
PERFORMING LABORATORY: NORMAL
TEST NAME: NORMAL

## 2025-06-09 LAB
SCANNED LAB RESULT: ABNORMAL
TEST NAME: ABNORMAL

## 2025-06-10 ENCOUNTER — TELEPHONE (OUTPATIENT)
Dept: MATERNAL FETAL MEDICINE | Facility: CLINIC | Age: 35
End: 2025-06-10
Payer: COMMERCIAL

## 2025-06-10 NOTE — TELEPHONE ENCOUNTER
Tamara 10, 2025    I called and spoke with Justin to discuss the couple's expanded carrier screening results ([facility] Carrier Screen). Justin had carrier screening for 526 conditions (no X-linked conditions). Justin was found to be a carrier for 2 conditions on the panel, described below. As well as having a variant detected in a pseudodeficiency allele. Justin 's partner, Anum, had carrier screening for 571 conditions (including no X-linked conditions). Anum was found to be a carrier for 2 conditions on the panel, described below. Overall, Justin and Anum were not found to be a carrier for the same condition.     Most of the conditions on the carrier screening panel are inherited in an autosomal recessive fashion. Every individual has two copies of the gene that is responsible for this condition, and if someone has a change or mutation that impacts how one copy of the gene functions, they are called a carrier for the condition.  If someone has two copies of the gene that have a harmful change, they are affected with the condition.  If two people who are carriers for the same condition have children, there is a chance for their children to be affected.  Each parent has a 50% chance for passing on their copy of the gene with a mutation, so there is a 25% chance for each pregnancy to get two copies of the mutation and be affected, a 50% chance for each pregnancy to be an unaffected carrier, and a 25% chance to be unaffected with two normal copies of the gene.    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 Other symptoms that can occur are easy bruising, muscle weakness, fatigue, and peripheral neuropahty (lessened sensation in the limbs).   EDS, Classic-Like, TNXB-Related  is inherited in an autosomal recessive manner, meaning that both parents must be carriers of EDS due to a TNXB variant in order for there to be an increased risk to have an affected child. Parents who are carriers of a single TNXB genetic variant are not expected to exhibit symptoms of EDS themselves.  Since Anum was NOT identified to be a carrier for this condition, the couple's residual reproductive risk is 1 in 2,164.       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  Individuals with factor V leiden thrombophilia are at higher risk of developing a deep venous thrombosis (DVT) which can occur throughout the body.  Factor V Leiden is the most common inherited form of thrombophilia. In fact, 3-8% of individuals of  ancestry have one copy of the factor V Leiden mutation and about 1 in 5,000 have two copies.  The risk of an abnormal clot depends on whether a person has one (heterozygous) or two (homozygous) copies of the factor V Leiden mutation:  The general population risk of abnormal clot is 1 in 1,000 (0.1%)  The risk of a heterozygote individual is 3-9 in 1,000 (0.3-0.8%) - Justin was identified to be heterozygous for Factor V Leiden  The risk of a homozygote individual is 80 in 1,000 (8%)  No family history of blood clots of DVTs was reported for Justin's family during our family history at his initial genetic counseling appointment.   Since Anum was NOT identified to be a carrier of this condition, the chance for the couple to have a child homozygous for this clotting disorder is 1 in 4,404.      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 It is a highly variable condition and symptoms can present anytime from infancy through adulthood.   Severely affected individuals typically develop lung disease between the ages of 20 and 50. Affected individuals can also develop severe liver disease, which can also increase the risk for liver cancer.   Generally speaking, carriers should not have symptoms of the condition, though they may have a slightly increased risk for lung or liver disease, which can be more severe with exposures (such as heavy drinking or smoking).  This particular S allele does not cause clinical disease when homozygous in an individual (S/S genotype), however, compound heterozygotes with an S/Z genotype (otherwise referred to as S/null) have an increased risk of emphysema.   Since Justin was not identified to have either an S or Z allele in the SERPINA1 gene, the risk for the couple to have a child affected with this condition is significantly reduced.     2) GJB2-related conditions: GJB2 c.35del (p.G12Vfs*2):  GJB2-related conditions include autosomal recessive nonsyndromic deafness (DFNB1) and autosomal dominant nonsyndromic deafness (DFNA3) along with severe conditions involving deafness and skin findings. Severity of hearing loss can vary, even among affected individuals in the same family.  This particular variant is expected to be associated with DFNB1 and NOT DFNA3.   Some individuals with a GJB2 variant may also have nonsyndromic deafness due to a deletion on the other chromosome that includes an portion of DNA upstream of GJB2 that encompasses part of GJB6. [employer] assay includes specific targets for this region.   Since Justin was NOT identified to be a carrier for this condition, the couple's residual reproductive risk is 1 in  16404.       Pseudodeficiency alleles:    Justin was additionally found to carry a pseudodeficiency allele in the IUDA gene, which can cause a false positive screen for the condition when enzyme based testing is performed for Mucopolysaccharidosis, Type I (MPS1 or Hurler syndrome). Enzyme based testing is most common for this condition on the  screening program, but can also be seen on some carrier screening panels.Pseudodeficiency alleles are not known to be associated with disease. However, individuals with pseudodeficiency alleles can exhibit false positive results on biochemical tests such as  screen or enzyme-based carrier screening.    No further screening or testing for the couple or a future pregnancy is indicated.  Again, while the chances of the aforementioned conditions are low, because the detection rate of testing is not 100%, if there is ever concern for symptoms in the couple's children in the future, referral to an appropriate practitioner for evaluation is recommended.       The couple's children will have a 50% chance of being an unaffected carrier of the aforementioned conditions.  Genetic counseling for the couple's children is recommended when they are of reproductive age.    [place]neet can share this information with relatives if they feel comfortable. Siblings would be at a 50% chance of also being a carrier for the same condition.     A copy of this result will be available in [facility] and the patient's [facility].       Elina Henderson MS, [facility]  Certified and Licensed Genetic Counselor  [facility]  Maternal Fetal Medicine  Office: [phone]  [facility]: [phone]   Fax: [phone]  [facility]